# Patient Record
Sex: FEMALE | Race: OTHER | HISPANIC OR LATINO
[De-identification: names, ages, dates, MRNs, and addresses within clinical notes are randomized per-mention and may not be internally consistent; named-entity substitution may affect disease eponyms.]

---

## 2018-05-16 PROBLEM — Z00.00 ENCOUNTER FOR PREVENTIVE HEALTH EXAMINATION: Status: ACTIVE | Noted: 2018-05-16

## 2018-05-23 ENCOUNTER — APPOINTMENT (OUTPATIENT)
Dept: HEMATOLOGY ONCOLOGY | Facility: CLINIC | Age: 18
End: 2018-05-23
Payer: MEDICAID

## 2018-05-23 VITALS
BODY MASS INDEX: 19.26 KG/M2 | DIASTOLIC BLOOD PRESSURE: 66 MMHG | SYSTOLIC BLOOD PRESSURE: 102 MMHG | RESPIRATION RATE: 16 BRPM | HEIGHT: 62.2 IN | OXYGEN SATURATION: 100 % | HEART RATE: 71 BPM | TEMPERATURE: 99.5 F | WEIGHT: 106 LBS

## 2018-05-23 DIAGNOSIS — G43.909 MIGRAINE, UNSPECIFIED, NOT INTRACTABLE, W/OUT STATUS MIGRAINOSUS: ICD-10-CM

## 2018-05-23 DIAGNOSIS — Z86.19 PERSONAL HISTORY OF OTHER INFECTIOUS AND PARASITIC DISEASES: ICD-10-CM

## 2018-05-23 DIAGNOSIS — R76.11 NONSPECIFIC REACTION TO TUBERCULIN SKIN TEST W/OUT ACTIVE TUBERCULOSIS: ICD-10-CM

## 2018-05-23 DIAGNOSIS — A74.9 CHLAMYDIAL INFECTION, UNSPECIFIED: ICD-10-CM

## 2018-05-23 PROCEDURE — 99245 OFF/OP CONSLTJ NEW/EST HI 55: CPT

## 2018-07-06 ENCOUNTER — APPOINTMENT (OUTPATIENT)
Dept: HEMATOLOGY ONCOLOGY | Facility: CLINIC | Age: 18
End: 2018-07-06
Payer: MEDICAID

## 2018-07-06 VITALS
WEIGHT: 105 LBS | BODY MASS INDEX: 19.08 KG/M2 | TEMPERATURE: 98 F | DIASTOLIC BLOOD PRESSURE: 67 MMHG | RESPIRATION RATE: 16 BRPM | HEART RATE: 78 BPM | HEIGHT: 62.2 IN | SYSTOLIC BLOOD PRESSURE: 104 MMHG | OXYGEN SATURATION: 100 %

## 2018-07-06 PROCEDURE — 99214 OFFICE O/P EST MOD 30 MIN: CPT

## 2018-11-02 ENCOUNTER — APPOINTMENT (OUTPATIENT)
Dept: HEMATOLOGY ONCOLOGY | Facility: CLINIC | Age: 18
End: 2018-11-02
Payer: MEDICAID

## 2018-11-02 ENCOUNTER — TRANSCRIPTION ENCOUNTER (OUTPATIENT)
Age: 18
End: 2018-11-02

## 2018-11-02 VITALS
TEMPERATURE: 98.3 F | OXYGEN SATURATION: 99 % | DIASTOLIC BLOOD PRESSURE: 64 MMHG | HEART RATE: 86 BPM | SYSTOLIC BLOOD PRESSURE: 97 MMHG | BODY MASS INDEX: 19.08 KG/M2 | RESPIRATION RATE: 16 BRPM | HEIGHT: 62.2 IN | WEIGHT: 105 LBS

## 2018-11-02 PROCEDURE — 99214 OFFICE O/P EST MOD 30 MIN: CPT

## 2019-02-08 ENCOUNTER — RESULT REVIEW (OUTPATIENT)
Age: 19
End: 2019-02-08

## 2019-02-08 ENCOUNTER — APPOINTMENT (OUTPATIENT)
Dept: HEMATOLOGY ONCOLOGY | Facility: CLINIC | Age: 19
End: 2019-02-08
Payer: MEDICAID

## 2019-02-08 VITALS
DIASTOLIC BLOOD PRESSURE: 65 MMHG | OXYGEN SATURATION: 100 % | RESPIRATION RATE: 16 BRPM | HEIGHT: 62.2 IN | SYSTOLIC BLOOD PRESSURE: 103 MMHG | TEMPERATURE: 99.6 F | HEART RATE: 75 BPM | BODY MASS INDEX: 18.35 KG/M2 | WEIGHT: 100.99 LBS

## 2019-02-08 DIAGNOSIS — R23.8 OTHER SKIN CHANGES: ICD-10-CM

## 2019-02-08 PROCEDURE — 99214 OFFICE O/P EST MOD 30 MIN: CPT

## 2019-02-14 PROBLEM — R23.8 EASY BRUISING: Status: ACTIVE | Noted: 2018-05-23

## 2019-02-14 NOTE — ASSESSMENT
[FreeTextEntry1] : Iron deficiency\par s/p IV injectafer x 1\par Unclear if there was any symptomatic benefit\par Hgb improved to 13.5\par Ferritin normal\par \par Easy bruising\par She has easy bruising interestingly noticed since March. Never had similar symptoms prior\par Manifested in the form of Mild brownish discoloration skin lateral left thigh - completed resolved\par No further work up unless recurs\par \par Work up largely unremarkable including platelet function testing which was negative except for  slight reduction of GP expression CD42b (GPIb-alpha) is of uncertain clinical significance, and likely represents laboratory artifact due to specimen processing and transportation\par vWF Ag 53%- repeat normal\par Doubt she has any clinically significant condition to cause easy bruising\par She reports resolution of her symptoms. No new bruises since last appointment\par \par She reports being 6 weeks pregnant\par \par Follow up in 3 months. CBC, CMP, ferritin\par

## 2019-02-14 NOTE — CONSULT LETTER
[FreeTextEntry3] : Wei Brunson MD, MPH\par Attending Physician\par Hematology Oncology\par [facility] [place]\par [facility]\par

## 2019-03-07 ENCOUNTER — TRANSCRIPTION ENCOUNTER (OUTPATIENT)
Age: 19
End: 2019-03-07

## 2019-05-10 ENCOUNTER — APPOINTMENT (OUTPATIENT)
Dept: HEMATOLOGY ONCOLOGY | Facility: CLINIC | Age: 19
End: 2019-05-10
Payer: MEDICAID

## 2019-05-10 VITALS
TEMPERATURE: 99.1 F | RESPIRATION RATE: 16 BRPM | WEIGHT: 105 LBS | BODY MASS INDEX: 19.08 KG/M2 | SYSTOLIC BLOOD PRESSURE: 92 MMHG | HEART RATE: 67 BPM | DIASTOLIC BLOOD PRESSURE: 62 MMHG | HEIGHT: 62.2 IN | OXYGEN SATURATION: 98 %

## 2019-05-10 PROCEDURE — 99214 OFFICE O/P EST MOD 30 MIN: CPT

## 2019-05-10 RX ORDER — FLUCONAZOLE 150 MG/1
150 TABLET ORAL
Qty: 4 | Refills: 0 | Status: DISCONTINUED | COMMUNITY
Start: 2018-06-27 | End: 2019-05-10

## 2019-05-10 RX ORDER — METRONIDAZOLE 7.5 MG/G
0.75 GEL VAGINAL
Qty: 70 | Refills: 0 | Status: DISCONTINUED | COMMUNITY
Start: 2018-06-12 | End: 2019-05-10

## 2019-05-10 RX ORDER — METRONIDAZOLE 500 MG/1
500 TABLET ORAL
Qty: 14 | Refills: 0 | Status: DISCONTINUED | COMMUNITY
Start: 2018-06-29 | End: 2019-05-10

## 2019-05-10 RX ORDER — NITROFURANTOIN (MONOHYDRATE/MACROCRYSTALS) 25; 75 MG/1; MG/1
100 CAPSULE ORAL
Qty: 10 | Refills: 0 | Status: DISCONTINUED | COMMUNITY
Start: 2018-07-02 | End: 2019-05-10

## 2019-05-10 NOTE — CONSULT LETTER
[Dear  ___] : Dear  [unfilled], [Courtesy Letter:] : I had the pleasure of seeing your patient, [unfilled], in my office today. [Please see my note below.] : Please see my note below. [Consult Closing:] : Thank you very much for allowing me to participate in the care of this patient.  If you have any questions, please do not hesitate to contact me. [Sincerely,] : Sincerely, [FreeTextEntry3] : Wei Brunson MD, MPH\par Attending Physician\par Hematology Oncology\par [facility] [place]\par [facility]\par

## 2019-05-10 NOTE — HISTORY OF PRESENT ILLNESS
[de-identified] : She is seen today for follow up visit\par \par She feels tired and fatigued but not as bad as before her initial iron infusion\par Had a miscarriage end of March- was a first trimester miscarriage - did lose some blood- subsequently had a heavy menses\par \par  [de-identified] : Patient is a 18 year old female consult for ?von willebrand disease \par \par Started having easy bruising in March 2018- saw PCP\par Went away for few weeks\par In April - she drove for ~2 hours wearing jeans- had bruising bilateral thighs- she did not have similar symptoms using same jeans before\par \par She saw her PCP who did labs - which showed vWF Ab of 53%\par \par LMP - on birth control - skipped in march- had menses all April. \par Usually normal and does not have heavy bleeding\par Bleeding all of april- she attributes to stopping her birth control pills\par \par No nose bleeds or gum bleeds\par Does not play sports or do weight lifting\par Bruises when she hits against somethings\par Usually resolves in 1-2 weeks\par Also had bruising in bilateral breast- resolves in a few days\par \par Has migraines - takes advil PRN. Last taken many months back\par \par No family history of bleeding\par \par Has sister - who may be bruising easily but she is clumsy\par \par Labs reviewed\par vWd panel - negative\par Coag panel - negative\par Fibrinogen: normal\par \par Platelet function glycoprotein by flow cytometry:\par platelet surface glycoprotein (GP) profiles are\par essentially normal. There are no significant deficiencies\par of CD41 (GPIIb), CD42a (GPIX), CD42b (GPIb-alpha), CD49b\par (GPIa), CD61 (GPIIIa) or GPVI.\par The slight reduction of GP expression CD42b (GPIb-alpha) is\par of uncertain clinical significance, and likely represents\par laboratory artifact due to specimen processing and\par transportation.\par

## 2019-05-10 NOTE — ASSESSMENT
[FreeTextEntry1] : Iron deficiency\par s/p IV injectafer x 1\par She felt better with iron infusion\par Feels tired\par Lost some blood with miscarriage and subsequent menses were heavy\par Hgb stable\par Ferritin pending. Patient will call to discuss findings/results in a few days\par \par Easy bruising\par She has easy bruising interestingly noticed since March. Never had similar symptoms prior\par Manifested in the form of Mild brownish discoloration skin lateral left thigh - completed resolved\par Work up largely unremarkable including platelet function testing which was negative except for  slight reduction of GP expression CD42b (GPIb-alpha) is of uncertain clinical significance, and likely represents laboratory artifact due to specimen processing and transportation\par vWF Ag 53%- repeat normal\par Doubt she has any clinically significant condition to cause easy bruising\par She reports resolution of her symptoms. No new bruises since\par No further work up unless any new symptoms\par \par Follow up in 3-4 months. CBC, CMP, ferritin\par

## 2019-09-10 ENCOUNTER — APPOINTMENT (OUTPATIENT)
Dept: OBGYN | Facility: CLINIC | Age: 19
End: 2019-09-10
Payer: MEDICAID

## 2019-09-10 VITALS
WEIGHT: 114 LBS | BODY MASS INDEX: 20.71 KG/M2 | SYSTOLIC BLOOD PRESSURE: 118 MMHG | HEIGHT: 62.2 IN | DIASTOLIC BLOOD PRESSURE: 78 MMHG

## 2019-09-10 PROCEDURE — 76817 TRANSVAGINAL US OBSTETRIC: CPT

## 2019-09-10 PROCEDURE — 99203 OFFICE O/P NEW LOW 30 MIN: CPT

## 2019-09-12 ENCOUNTER — RX RENEWAL (OUTPATIENT)
Age: 19
End: 2019-09-12

## 2019-09-13 ENCOUNTER — APPOINTMENT (OUTPATIENT)
Dept: HEMATOLOGY ONCOLOGY | Facility: CLINIC | Age: 19
End: 2019-09-13
Payer: MEDICAID

## 2019-09-13 ENCOUNTER — RESULT REVIEW (OUTPATIENT)
Age: 19
End: 2019-09-13

## 2019-09-13 VITALS
TEMPERATURE: 98 F | SYSTOLIC BLOOD PRESSURE: 101 MMHG | OXYGEN SATURATION: 99 % | HEIGHT: 62.17 IN | DIASTOLIC BLOOD PRESSURE: 64 MMHG | BODY MASS INDEX: 20.35 KG/M2 | RESPIRATION RATE: 20 BRPM | WEIGHT: 112 LBS | HEART RATE: 74 BPM

## 2019-09-13 PROCEDURE — 99214 OFFICE O/P EST MOD 30 MIN: CPT

## 2019-09-13 NOTE — ASSESSMENT
[FreeTextEntry1] : Iron deficiency\par Clinically feels better\par LMP 7/21/19, she is ~6 weeks pregnant\par Ferritin pending. Patient will call to discuss findings/results in a few days\par She cannot tolerate iron pills but is willing to try liquid iron if needed\par Prenatal per ob-gyn\par \par Easy bruising\par She has easy bruising interestingly noticed since March. Never had similar symptoms prior\par Manifested in the form of Mild brownish discoloration skin lateral left thigh - completed resolved\par Work up largely unremarkable including platelet function testing which was negative except for  slight reduction of GP expression CD42b (GPIb-alpha) is of uncertain clinical significance, and likely represents laboratory artifact due to specimen processing and transportation\par vWF Ag 53%- repeat normal\par Doubt she has any clinically significant condition to cause easy bruising\par She reports resolution of her symptoms. No new bruises since\par No further work up unless any new symptoms\par \par Follow up in 8 months or sooner if needed. CBC, CMP, ferritin\par

## 2019-09-13 NOTE — HISTORY OF PRESENT ILLNESS
[de-identified] : Patient is a 18 year old female consult for ?von willebrand disease \par \par Started having easy bruising in March 2018- saw PCP\par Went away for few weeks\par In April - she drove for ~2 hours wearing jeans- had bruising bilateral thighs- she did not have similar symptoms using same jeans before\par \par She saw her PCP who did labs - which showed vWF Ab of 53%\par \par LMP - on birth control - skipped in march- had menses all April. \par Usually normal and does not have heavy bleeding\par Bleeding all of april- she attributes to stopping her birth control pills\par \par No nose bleeds or gum bleeds\par Does not play sports or do weight lifting\par Bruises when she hits against somethings\par Usually resolves in 1-2 weeks\par Also had bruising in bilateral breast- resolves in a few days\par \par Has migraines - takes advil PRN. Last taken many months back\par \par No family history of bleeding\par \par Has sister - who may be bruising easily but she is clumsy\par \par Labs reviewed\par vWd panel - negative\par Coag panel - negative\par Fibrinogen: normal\par \par Platelet function glycoprotein by flow cytometry:\par platelet surface glycoprotein (GP) profiles are\par essentially normal. There are no significant deficiencies\par of CD41 (GPIIb), CD42a (GPIX), CD42b (GPIb-alpha), CD49b\par (GPIa), CD61 (GPIIIa) or GPVI.\par The slight reduction of GP expression CD42b (GPIb-alpha) is\par of uncertain clinical significance, and likely represents\par laboratory artifact due to specimen processing and\par transportation.\par    [de-identified] : She is seen today for follow up visit\par \par She feels good overall\par Her LMP was 7/21/19\par She is 6 weeks pregnant and follows with Dr Crowell (gyn)

## 2019-09-13 NOTE — CONSULT LETTER
[Dear  ___] : Dear  [unfilled], [Courtesy Letter:] : I had the pleasure of seeing your patient, [unfilled], in my office today. [Please see my note below.] : Please see my note below. [Consult Closing:] : Thank you very much for allowing me to participate in the care of this patient.  If you have any questions, please do not hesitate to contact me. [Sincerely,] : Sincerely, [FreeTextEntry3] : Wei Brunson MD, MPH\par Attending Physician\par Hematology Oncology\par [facility] [place]\par [facility]\par  [DrAz  ___] : Dr. GARRETT

## 2019-09-17 ENCOUNTER — RX CHANGE (OUTPATIENT)
Age: 19
End: 2019-09-17

## 2019-09-18 LAB
CANDIDA VAG CYTO: DETECTED
G VAGINALIS+PREV SP MTYP VAG QL MICRO: DETECTED
T VAGINALIS VAG QL WET PREP: NOT DETECTED

## 2019-09-26 DIAGNOSIS — Z32.00 ENCOUNTER FOR PREGNANCY TEST, RESULT UNKNOWN: ICD-10-CM

## 2019-09-27 ENCOUNTER — APPOINTMENT (OUTPATIENT)
Dept: OBGYN | Facility: CLINIC | Age: 19
End: 2019-09-27
Payer: MEDICAID

## 2019-09-27 VITALS
BODY MASS INDEX: 20.06 KG/M2 | WEIGHT: 109 LBS | SYSTOLIC BLOOD PRESSURE: 90 MMHG | HEIGHT: 62 IN | DIASTOLIC BLOOD PRESSURE: 60 MMHG

## 2019-09-27 PROCEDURE — 99213 OFFICE O/P EST LOW 20 MIN: CPT

## 2019-10-08 ENCOUNTER — APPOINTMENT (OUTPATIENT)
Dept: OBGYN | Facility: CLINIC | Age: 19
End: 2019-10-08
Payer: MEDICAID

## 2019-10-08 ENCOUNTER — NON-APPOINTMENT (OUTPATIENT)
Age: 19
End: 2019-10-08

## 2019-10-08 VITALS
SYSTOLIC BLOOD PRESSURE: 106 MMHG | BODY MASS INDEX: 21.9 KG/M2 | HEIGHT: 61 IN | WEIGHT: 116 LBS | DIASTOLIC BLOOD PRESSURE: 60 MMHG

## 2019-10-08 PROCEDURE — 0500F INITIAL PRENATAL CARE VISIT: CPT

## 2019-10-08 PROCEDURE — 36415 COLL VENOUS BLD VENIPUNCTURE: CPT

## 2019-10-08 PROCEDURE — 99213 OFFICE O/P EST LOW 20 MIN: CPT

## 2019-10-15 ENCOUNTER — NON-APPOINTMENT (OUTPATIENT)
Age: 19
End: 2019-10-15

## 2019-10-15 ENCOUNTER — APPOINTMENT (OUTPATIENT)
Dept: OBGYN | Facility: CLINIC | Age: 19
End: 2019-10-15
Payer: MEDICAID

## 2019-10-15 VITALS
WEIGHT: 118 LBS | SYSTOLIC BLOOD PRESSURE: 106 MMHG | DIASTOLIC BLOOD PRESSURE: 62 MMHG | BODY MASS INDEX: 22.28 KG/M2 | HEIGHT: 61 IN

## 2019-10-15 DIAGNOSIS — N76.0 ACUTE VAGINITIS: ICD-10-CM

## 2019-10-15 DIAGNOSIS — B96.89 ACUTE VAGINITIS: ICD-10-CM

## 2019-10-15 PROCEDURE — 99213 OFFICE O/P EST LOW 20 MIN: CPT

## 2019-10-15 NOTE — PHYSICAL EXAM
[Normal] : cervix [No Bleeding] : there was no active vaginal bleeding [FreeTextEntry4] : thick yellowish discharge c/w BV noted; no erythema. [Uterine Adnexae] : were not tender and not enlarged

## 2019-10-17 ENCOUNTER — RESULT REVIEW (OUTPATIENT)
Age: 19
End: 2019-10-17

## 2019-10-17 DIAGNOSIS — Z86.19 PERSONAL HISTORY OF OTHER INFECTIOUS AND PARASITIC DISEASES: ICD-10-CM

## 2019-10-17 LAB
ABO + RH PNL BLD: NORMAL
ABO + RH PNL BLD: NORMAL
APPEARANCE: CLEAR
AR GENE MUT ANL BLD/T: NEGATIVE
BACTERIA UR CULT: NORMAL
BACTERIA: NEGATIVE
BASOPHILS # BLD AUTO: 0.02 K/UL
BASOPHILS NFR BLD AUTO: 0.2 %
BILIRUB UR QL STRIP: NORMAL
BILIRUBIN URINE: NEGATIVE
BLD GP AB SCN SERPL QL: NORMAL
BLOOD URINE: NEGATIVE
C TRACH RRNA SPEC QL NAA+PROBE: NOT DETECTED
CANDIDA VAG CYTO: DETECTED
CFTR MUT TESTED BLD/T: NEGATIVE
COLOR: NORMAL
EOSINOPHIL # BLD AUTO: 0.06 K/UL
EOSINOPHIL NFR BLD AUTO: 0.6 %
G VAGINALIS+PREV SP MTYP VAG QL MICRO: DETECTED
GLUCOSE QUALITATIVE U: NEGATIVE
GLUCOSE UR-MCNC: NORMAL
HBV SURFACE AG SER QL: NONREACTIVE
HCG UR QL: 0.2 EU/DL
HCT VFR BLD CALC: 38.4 %
HCV AB SER QL: NONREACTIVE
HCV S/CO RATIO: 0.19 S/CO
HGB A MFR BLD: 97.2 %
HGB A2 MFR BLD: 2.8 %
HGB BLD-MCNC: 12.6 G/DL
HGB FRACT BLD-IMP: NORMAL
HGB UR QL STRIP.AUTO: NORMAL
HIV1+2 AB SPEC QL IA.RAPID: NONREACTIVE
HYALINE CASTS: 0 /LPF
IMM GRANULOCYTES NFR BLD AUTO: 0.4 %
KETONES UR-MCNC: NORMAL
KETONES URINE: NEGATIVE
LEUKOCYTE ESTERASE UR QL STRIP: NORMAL
LEUKOCYTE ESTERASE URINE: NEGATIVE
LYMPHOCYTES # BLD AUTO: 2.15 K/UL
LYMPHOCYTES NFR BLD AUTO: 22.6 %
MAN DIFF?: NORMAL
MCHC RBC-ENTMCNC: 30.1 PG
MCHC RBC-ENTMCNC: 32.8 GM/DL
MCV RBC AUTO: 91.6 FL
MEV IGG FLD QL IA: 202 AU/ML
MEV IGG+IGM SER-IMP: POSITIVE
MICROSCOPIC-UA: NORMAL
MONOCYTES # BLD AUTO: 0.52 K/UL
MONOCYTES NFR BLD AUTO: 5.5 %
N GONORRHOEA RRNA SPEC QL NAA+PROBE: NOT DETECTED
NEUTROPHILS # BLD AUTO: 6.72 K/UL
NEUTROPHILS NFR BLD AUTO: 70.7 %
NITRITE UR QL STRIP: NORMAL
NITRITE URINE: NEGATIVE
PH UR STRIP: 7
PH URINE: 7
PLATELET # BLD AUTO: 258 K/UL
PROT UR STRIP-MCNC: NORMAL
PROTEIN URINE: NEGATIVE
RBC # BLD: 4.19 M/UL
RBC # FLD: 12.5 %
RED BLOOD CELLS URINE: 4 /HPF
RUBV IGG FLD-ACNC: 0.4 INDEX
RUBV IGG SER-IMP: NEGATIVE
SICKLE CELLS BLD QL SMEAR: NEGATIVE
SOURCE AMPLIFICATION: NORMAL
SP GR UR STRIP: 1.02
SPECIFIC GRAVITY URINE: 1.01
SQUAMOUS EPITHELIAL CELLS: 2 /HPF
T PALLIDUM AB SER QL IA: NEGATIVE
T VAGINALIS VAG QL WET PREP: NOT DETECTED
UROBILINOGEN URINE: NORMAL
WBC # FLD AUTO: 9.51 K/UL
WHITE BLOOD CELLS URINE: 0 /HPF

## 2019-10-21 ENCOUNTER — APPOINTMENT (OUTPATIENT)
Dept: OBGYN | Facility: CLINIC | Age: 19
End: 2019-10-21

## 2019-10-23 ENCOUNTER — NON-APPOINTMENT (OUTPATIENT)
Age: 19
End: 2019-10-23

## 2019-10-31 ENCOUNTER — NON-APPOINTMENT (OUTPATIENT)
Age: 19
End: 2019-10-31

## 2019-10-31 ENCOUNTER — APPOINTMENT (OUTPATIENT)
Dept: OBGYN | Facility: CLINIC | Age: 19
End: 2019-10-31
Payer: MEDICAID

## 2019-10-31 VITALS
BODY MASS INDEX: 21.52 KG/M2 | DIASTOLIC BLOOD PRESSURE: 70 MMHG | WEIGHT: 114 LBS | SYSTOLIC BLOOD PRESSURE: 110 MMHG | HEIGHT: 61 IN

## 2019-10-31 LAB
BILIRUB UR QL STRIP: NORMAL
CLARITY UR: CLEAR
COLLECTION METHOD: NORMAL
GLUCOSE UR-MCNC: NORMAL
HCG UR QL: 0.2 EU/DL
HGB UR QL STRIP.AUTO: NORMAL
KETONES UR-MCNC: NORMAL
LEUKOCYTE ESTERASE UR QL STRIP: NORMAL
NITRITE UR QL STRIP: NORMAL
PH UR STRIP: 7
PROT UR STRIP-MCNC: NORMAL
SP GR UR STRIP: 1.01

## 2019-10-31 PROCEDURE — 81015 MICROSCOPIC EXAM OF URINE: CPT

## 2019-10-31 PROCEDURE — 99213 OFFICE O/P EST LOW 20 MIN: CPT

## 2019-10-31 PROCEDURE — 0502F SUBSEQUENT PRENATAL CARE: CPT

## 2019-11-13 ENCOUNTER — NON-APPOINTMENT (OUTPATIENT)
Age: 19
End: 2019-11-13

## 2019-11-22 ENCOUNTER — APPOINTMENT (OUTPATIENT)
Dept: OBGYN | Facility: CLINIC | Age: 19
End: 2019-11-22
Payer: MEDICAID

## 2019-11-22 ENCOUNTER — LABORATORY RESULT (OUTPATIENT)
Age: 19
End: 2019-11-22

## 2019-11-22 ENCOUNTER — NON-APPOINTMENT (OUTPATIENT)
Age: 19
End: 2019-11-22

## 2019-11-22 VITALS
SYSTOLIC BLOOD PRESSURE: 110 MMHG | HEIGHT: 61 IN | DIASTOLIC BLOOD PRESSURE: 70 MMHG | BODY MASS INDEX: 21.9 KG/M2 | WEIGHT: 116 LBS

## 2019-11-22 PROCEDURE — 81015 MICROSCOPIC EXAM OF URINE: CPT

## 2019-11-22 PROCEDURE — 0502F SUBSEQUENT PRENATAL CARE: CPT

## 2019-11-22 PROCEDURE — 99213 OFFICE O/P EST LOW 20 MIN: CPT

## 2019-11-26 ENCOUNTER — NON-APPOINTMENT (OUTPATIENT)
Age: 19
End: 2019-11-26

## 2019-11-26 LAB
2ND TRIMESTER DATA: NORMAL
AFP PNL SERPL: NORMAL
AFP SERPL-ACNC: NORMAL
APPEARANCE: ABNORMAL
BACTERIA UR CULT: NORMAL
BILIRUB UR QL STRIP: NEGATIVE
BILIRUBIN URINE: NEGATIVE
BLOOD URINE: NEGATIVE
CANDIDA VAG CYTO: NOT DETECTED
CLARITY UR: NORMAL
CLINICAL BIOCHEMIST REVIEW: NORMAL
COLLECTION METHOD: NORMAL
COLOR: YELLOW
G VAGINALIS+PREV SP MTYP VAG QL MICRO: DETECTED
GLUCOSE QUALITATIVE U: NEGATIVE
GLUCOSE UR-MCNC: NEGATIVE
HCG UR QL: 0.2 EU/DL
HGB UR QL STRIP.AUTO: NEGATIVE
KETONES UR-MCNC: NEGATIVE
KETONES URINE: NEGATIVE
LEUKOCYTE ESTERASE UR QL STRIP: NORMAL
LEUKOCYTE ESTERASE URINE: ABNORMAL
NITRITE UR QL STRIP: NEGATIVE
NITRITE URINE: NEGATIVE
NOTES NTD: NORMAL
PH UR STRIP: 7.5
PH URINE: 7.5
PROT UR STRIP-MCNC: 30
PROTEIN URINE: ABNORMAL
SP GR UR STRIP: 1.01
SPECIFIC GRAVITY URINE: 1.02
T VAGINALIS VAG QL WET PREP: NOT DETECTED
UROBILINOGEN URINE: NORMAL

## 2019-12-11 ENCOUNTER — NON-APPOINTMENT (OUTPATIENT)
Age: 19
End: 2019-12-11

## 2019-12-20 ENCOUNTER — NON-APPOINTMENT (OUTPATIENT)
Age: 19
End: 2019-12-20

## 2019-12-20 ENCOUNTER — APPOINTMENT (OUTPATIENT)
Dept: OBGYN | Facility: CLINIC | Age: 19
End: 2019-12-20
Payer: MEDICAID

## 2019-12-20 VITALS
HEIGHT: 61 IN | SYSTOLIC BLOOD PRESSURE: 90 MMHG | DIASTOLIC BLOOD PRESSURE: 60 MMHG | WEIGHT: 116 LBS | BODY MASS INDEX: 21.9 KG/M2

## 2019-12-20 DIAGNOSIS — N39.0 URINARY TRACT INFECTION, SITE NOT SPECIFIED: ICD-10-CM

## 2019-12-20 PROCEDURE — 0502F SUBSEQUENT PRENATAL CARE: CPT

## 2019-12-20 PROCEDURE — 99213 OFFICE O/P EST LOW 20 MIN: CPT

## 2019-12-23 ENCOUNTER — NON-APPOINTMENT (OUTPATIENT)
Age: 19
End: 2019-12-23

## 2020-01-03 ENCOUNTER — MEDICATION RENEWAL (OUTPATIENT)
Age: 20
End: 2020-01-03

## 2020-01-07 ENCOUNTER — APPOINTMENT (OUTPATIENT)
Dept: OBGYN | Facility: CLINIC | Age: 20
End: 2020-01-07

## 2020-01-14 ENCOUNTER — APPOINTMENT (OUTPATIENT)
Dept: OBGYN | Facility: CLINIC | Age: 20
End: 2020-01-14
Payer: MEDICAID

## 2020-01-14 ENCOUNTER — NON-APPOINTMENT (OUTPATIENT)
Age: 20
End: 2020-01-14

## 2020-01-14 VITALS
WEIGHT: 127 LBS | SYSTOLIC BLOOD PRESSURE: 116 MMHG | DIASTOLIC BLOOD PRESSURE: 78 MMHG | HEIGHT: 61 IN | BODY MASS INDEX: 23.98 KG/M2

## 2020-01-14 PROCEDURE — 99213 OFFICE O/P EST LOW 20 MIN: CPT

## 2020-01-14 PROCEDURE — 0502F SUBSEQUENT PRENATAL CARE: CPT

## 2020-01-18 LAB
BACTERIA UR CULT: NORMAL
BILIRUB UR QL STRIP: NORMAL
CLARITY UR: CLEAR
COLLECTION METHOD: NORMAL
GLUCOSE 1H P 50 G GLC PO SERPL-MCNC: 85 MG/DL
GLUCOSE UR-MCNC: NORMAL
HCG UR QL: 0.2 EU/DL
HGB UR QL STRIP.AUTO: NORMAL
KETONES UR-MCNC: NORMAL
LEUKOCYTE ESTERASE UR QL STRIP: NORMAL
NITRITE UR QL STRIP: NORMAL
PH UR STRIP: 7
PROT UR STRIP-MCNC: NORMAL
SP GR UR STRIP: 1.02

## 2020-01-21 LAB
BILIRUB UR QL STRIP: NORMAL
CLARITY UR: CLEAR
COLLECTION METHOD: NORMAL
GLUCOSE UR-MCNC: NORMAL
HCG UR QL: 0.2 EU/DL
HGB UR QL STRIP.AUTO: NORMAL
KETONES UR-MCNC: NORMAL
LEUKOCYTE ESTERASE UR QL STRIP: NORMAL
NITRITE UR QL STRIP: NORMAL
PH UR STRIP: 7
PROT UR STRIP-MCNC: 30
SP GR UR STRIP: 1.02

## 2020-01-27 LAB
BASOPHILS # BLD AUTO: 0.01 K/UL
BASOPHILS NFR BLD AUTO: 0.1 %
EOSINOPHIL # BLD AUTO: 0.06 K/UL
EOSINOPHIL NFR BLD AUTO: 0.6 %
HCT VFR BLD CALC: 32.7 %
HGB BLD-MCNC: 10.6 G/DL
IMM GRANULOCYTES NFR BLD AUTO: 0.7 %
LYMPHOCYTES # BLD AUTO: 1 K/UL
LYMPHOCYTES NFR BLD AUTO: 9.3 %
MAN DIFF?: NORMAL
MCHC RBC-ENTMCNC: 30.7 PG
MCHC RBC-ENTMCNC: 32.4 GM/DL
MCV RBC AUTO: 94.8 FL
MONOCYTES # BLD AUTO: 0.86 K/UL
MONOCYTES NFR BLD AUTO: 8 %
NEUTROPHILS # BLD AUTO: 8.69 K/UL
NEUTROPHILS NFR BLD AUTO: 81.3 %
PLATELET # BLD AUTO: 252 K/UL
RBC # BLD: 3.45 M/UL
RBC # FLD: 13.4 %
WBC # FLD AUTO: 10.7 K/UL

## 2020-01-28 ENCOUNTER — NON-APPOINTMENT (OUTPATIENT)
Age: 20
End: 2020-01-28

## 2020-01-28 ENCOUNTER — APPOINTMENT (OUTPATIENT)
Dept: OBGYN | Facility: CLINIC | Age: 20
End: 2020-01-28
Payer: MEDICAID

## 2020-01-28 VITALS
HEIGHT: 61 IN | BODY MASS INDEX: 23.98 KG/M2 | DIASTOLIC BLOOD PRESSURE: 60 MMHG | SYSTOLIC BLOOD PRESSURE: 102 MMHG | WEIGHT: 127 LBS

## 2020-01-28 PROCEDURE — 0502F SUBSEQUENT PRENATAL CARE: CPT

## 2020-01-28 PROCEDURE — 99213 OFFICE O/P EST LOW 20 MIN: CPT

## 2020-01-30 ENCOUNTER — NON-APPOINTMENT (OUTPATIENT)
Age: 20
End: 2020-01-30

## 2020-02-07 ENCOUNTER — MED ADMIN CHARGE (OUTPATIENT)
Age: 20
End: 2020-02-07

## 2020-02-07 ENCOUNTER — NON-APPOINTMENT (OUTPATIENT)
Age: 20
End: 2020-02-07

## 2020-02-07 ENCOUNTER — APPOINTMENT (OUTPATIENT)
Dept: OBGYN | Facility: CLINIC | Age: 20
End: 2020-02-07
Payer: MEDICAID

## 2020-02-07 VITALS
WEIGHT: 129 LBS | BODY MASS INDEX: 24.35 KG/M2 | SYSTOLIC BLOOD PRESSURE: 110 MMHG | DIASTOLIC BLOOD PRESSURE: 80 MMHG | HEIGHT: 61 IN

## 2020-02-07 PROCEDURE — 0502F SUBSEQUENT PRENATAL CARE: CPT

## 2020-02-07 PROCEDURE — 99213 OFFICE O/P EST LOW 20 MIN: CPT

## 2020-02-11 ENCOUNTER — NON-APPOINTMENT (OUTPATIENT)
Age: 20
End: 2020-02-11

## 2020-02-11 LAB
BILIRUB UR QL STRIP: NORMAL
CANDIDA VAG CYTO: DETECTED
CLARITY UR: CLEAR
COLLECTION METHOD: NORMAL
FERRITIN SERPL-MCNC: 73 NG/ML
G VAGINALIS+PREV SP MTYP VAG QL MICRO: NOT DETECTED
GLUCOSE UR-MCNC: NORMAL
HGB UR QL STRIP.AUTO: NORMAL
KETONES UR-MCNC: NORMAL
LEUKOCYTE ESTERASE UR QL STRIP: NORMAL
NITRITE UR QL STRIP: NORMAL
PH UR STRIP: 7
SP GR UR STRIP: 1.02
T VAGINALIS VAG QL WET PREP: NOT DETECTED

## 2020-02-25 ENCOUNTER — APPOINTMENT (OUTPATIENT)
Dept: OBGYN | Facility: CLINIC | Age: 20
End: 2020-02-25
Payer: MEDICAID

## 2020-02-25 ENCOUNTER — NON-APPOINTMENT (OUTPATIENT)
Age: 20
End: 2020-02-25

## 2020-02-25 VITALS
HEIGHT: 61 IN | BODY MASS INDEX: 24.73 KG/M2 | SYSTOLIC BLOOD PRESSURE: 110 MMHG | WEIGHT: 131 LBS | DIASTOLIC BLOOD PRESSURE: 70 MMHG

## 2020-02-25 PROCEDURE — 99213 OFFICE O/P EST LOW 20 MIN: CPT

## 2020-02-25 PROCEDURE — 0502F SUBSEQUENT PRENATAL CARE: CPT

## 2020-02-26 ENCOUNTER — RESULT REVIEW (OUTPATIENT)
Age: 20
End: 2020-02-26

## 2020-02-26 ENCOUNTER — APPOINTMENT (OUTPATIENT)
Dept: HEMATOLOGY ONCOLOGY | Facility: CLINIC | Age: 20
End: 2020-02-26
Payer: MEDICAID

## 2020-02-26 VITALS
HEIGHT: 60.98 IN | WEIGHT: 132.4 LBS | SYSTOLIC BLOOD PRESSURE: 98 MMHG | HEART RATE: 90 BPM | RESPIRATION RATE: 16 BRPM | TEMPERATURE: 97.9 F | OXYGEN SATURATION: 99 % | DIASTOLIC BLOOD PRESSURE: 54 MMHG | BODY MASS INDEX: 25 KG/M2

## 2020-02-26 PROCEDURE — 99214 OFFICE O/P EST MOD 30 MIN: CPT

## 2020-02-26 NOTE — ASSESSMENT
[FreeTextEntry1] : Iron deficiency\par Clinically feels better\par LMP 7/21/19, she is ~30 weeks pregnant\par She had Ferritin done with Dr Crowell and it was slightly slow\par She was tired and fatigued and called with results s/p injectafer x 1\par Labs reviewed and discussed\par Expect her ferritin to be normal or high\par IV Iron PRN\par She cannot tolerate iron pills\par Prenatal per ob-gyn\par COntinue with prenatal vitamins\par \par Easy bruising\par She has easy bruising interestingly noticed since March. Never had similar symptoms prior\par Manifested in the form of Mild brownish discoloration skin lateral left thigh - completed resolved\par Work up largely unremarkable including platelet function testing which was negative except for  slight reduction of GP expression CD42b (GPIb-alpha) is of uncertain clinical significance, and likely represents laboratory artifact due to specimen processing and transportation\par vWF Ag 53%- repeat normal\par Doubt she has any clinically significant condition to cause easy bruising\par She reports resolution of her symptoms. No new bruises since\par No further work up unless any new symptoms\par \par Follow up in 8 weeks or sooner if needed. CBC, CMP, ferritin\par

## 2020-02-26 NOTE — CONSULT LETTER
[Dear  ___] : Dear  [unfilled], [Please see my note below.] : Please see my note below. [Courtesy Letter:] : I had the pleasure of seeing your patient, [unfilled], in my office today. [Sincerely,] : Sincerely, [Consult Closing:] : Thank you very much for allowing me to participate in the care of this patient.  If you have any questions, please do not hesitate to contact me. [DrAz  ___] : Dr. GARRETT [FreeTextEntry3] : Wei Brunson MD, MPH\par Attending Physician\par Hematology Oncology\par [facility] [place]\par [facility]\par

## 2020-02-26 NOTE — HISTORY OF PRESENT ILLNESS
[de-identified] : Patient is a 18 year old female consult for ?von willebrand disease \par \par Started having easy bruising in March 2018- saw PCP\par Went away for few weeks\par In April - she drove for ~2 hours wearing jeans- had bruising bilateral thighs- she did not have similar symptoms using same jeans before\par \par She saw her PCP who did labs - which showed vWF Ab of 53%\par \par LMP - on birth control - skipped in march- had menses all April. \par Usually normal and does not have heavy bleeding\par Bleeding all of april- she attributes to stopping her birth control pills\par \par No nose bleeds or gum bleeds\par Does not play sports or do weight lifting\par Bruises when she hits against somethings\par Usually resolves in 1-2 weeks\par Also had bruising in bilateral breast- resolves in a few days\par \par Has migraines - takes advil PRN. Last taken many months back\par \par No family history of bleeding\par \par Has sister - who may be bruising easily but she is clumsy\par \par Labs reviewed\par vWd panel - negative\par Coag panel - negative\par Fibrinogen: normal\par \par Platelet function glycoprotein by flow cytometry:\par platelet surface glycoprotein (GP) profiles are\par essentially normal. There are no significant deficiencies\par of CD41 (GPIIb), CD42a (GPIX), CD42b (GPIb-alpha), CD49b\par (GPIa), CD61 (GPIIIa) or GPVI.\par The slight reduction of GP expression CD42b (GPIb-alpha) is\par of uncertain clinical significance, and likely represents\par laboratory artifact due to specimen processing and\par transportation.\par    [de-identified] : She is seen today for follow up visit.\par She is 30 weeks pregnant. YISEL 5/4/20\par \par Received IV Injectafer 2 weeks back. Tolerated it very well. No complications\par Continues to feel tired and fatigued- attributes to the emotional ups and downs of pregnancy. [place], however she does have her family and her boyfriends family to help her with the pregnancy\par \par

## 2020-03-10 ENCOUNTER — APPOINTMENT (OUTPATIENT)
Dept: OBGYN | Facility: CLINIC | Age: 20
End: 2020-03-10
Payer: MEDICAID

## 2020-03-10 VITALS
WEIGHT: 136 LBS | HEIGHT: 60 IN | SYSTOLIC BLOOD PRESSURE: 100 MMHG | DIASTOLIC BLOOD PRESSURE: 68 MMHG | BODY MASS INDEX: 26.7 KG/M2

## 2020-03-10 LAB
BILIRUB UR QL STRIP: NEGATIVE
CLARITY UR: CLEAR
COLLECTION METHOD: NORMAL
GLUCOSE UR-MCNC: NEGATIVE
HCG UR QL: 0.2 EU/DL
HGB UR QL STRIP.AUTO: NEGATIVE
KETONES UR-MCNC: NEGATIVE
LEUKOCYTE ESTERASE UR QL STRIP: NORMAL
NITRITE UR QL STRIP: NEGATIVE
PH UR STRIP: 7
PROT UR STRIP-MCNC: NORMAL
SP GR UR STRIP: 1.02

## 2020-03-10 PROCEDURE — 0502F SUBSEQUENT PRENATAL CARE: CPT

## 2020-03-10 PROCEDURE — 99213 OFFICE O/P EST LOW 20 MIN: CPT

## 2020-03-17 ENCOUNTER — APPOINTMENT (OUTPATIENT)
Dept: OBGYN | Facility: CLINIC | Age: 20
End: 2020-03-17
Payer: MEDICAID

## 2020-03-17 ENCOUNTER — NON-APPOINTMENT (OUTPATIENT)
Age: 20
End: 2020-03-17

## 2020-03-17 VITALS
BODY MASS INDEX: 26.7 KG/M2 | DIASTOLIC BLOOD PRESSURE: 70 MMHG | SYSTOLIC BLOOD PRESSURE: 100 MMHG | HEIGHT: 60 IN | WEIGHT: 136 LBS

## 2020-03-17 PROCEDURE — 99213 OFFICE O/P EST LOW 20 MIN: CPT

## 2020-03-17 PROCEDURE — 0502F SUBSEQUENT PRENATAL CARE: CPT

## 2020-03-25 ENCOUNTER — APPOINTMENT (OUTPATIENT)
Dept: OBGYN | Facility: CLINIC | Age: 20
End: 2020-03-25

## 2020-04-01 ENCOUNTER — APPOINTMENT (OUTPATIENT)
Dept: OBGYN | Facility: CLINIC | Age: 20
End: 2020-04-01
Payer: MEDICAID

## 2020-04-01 ENCOUNTER — NON-APPOINTMENT (OUTPATIENT)
Age: 20
End: 2020-04-01

## 2020-04-01 VITALS
WEIGHT: 135 LBS | BODY MASS INDEX: 26.5 KG/M2 | HEIGHT: 60 IN | SYSTOLIC BLOOD PRESSURE: 90 MMHG | DIASTOLIC BLOOD PRESSURE: 60 MMHG | TEMPERATURE: 99.8 F

## 2020-04-01 PROCEDURE — 99213 OFFICE O/P EST LOW 20 MIN: CPT

## 2020-04-01 PROCEDURE — 0502F SUBSEQUENT PRENATAL CARE: CPT

## 2020-04-03 LAB — BACTERIA UR CULT: NORMAL

## 2020-04-08 ENCOUNTER — NON-APPOINTMENT (OUTPATIENT)
Age: 20
End: 2020-04-08

## 2020-04-08 ENCOUNTER — APPOINTMENT (OUTPATIENT)
Dept: OBGYN | Facility: CLINIC | Age: 20
End: 2020-04-08
Payer: MEDICAID

## 2020-04-08 VITALS
HEIGHT: 60 IN | DIASTOLIC BLOOD PRESSURE: 60 MMHG | WEIGHT: 138 LBS | BODY MASS INDEX: 27.09 KG/M2 | TEMPERATURE: 98 F | SYSTOLIC BLOOD PRESSURE: 110 MMHG

## 2020-04-08 LAB
BILIRUB UR QL STRIP: NORMAL
GLUCOSE UR-MCNC: NORMAL
HCG UR QL: 0.2 EU/DL
HGB UR QL STRIP.AUTO: NORMAL
KETONES UR-MCNC: NORMAL
LEUKOCYTE ESTERASE UR QL STRIP: NORMAL
NITRITE UR QL STRIP: NORMAL
PH UR STRIP: 7.5
PROT UR STRIP-MCNC: 30
SP GR UR STRIP: 1.02

## 2020-04-08 PROCEDURE — 99213 OFFICE O/P EST LOW 20 MIN: CPT

## 2020-04-08 PROCEDURE — 0502F SUBSEQUENT PRENATAL CARE: CPT

## 2020-04-10 LAB — BACTERIA UR CULT: NORMAL

## 2020-04-15 ENCOUNTER — NON-APPOINTMENT (OUTPATIENT)
Age: 20
End: 2020-04-15

## 2020-04-15 LAB — B-HEM STREP SPEC QL CULT: NORMAL

## 2020-04-17 ENCOUNTER — APPOINTMENT (OUTPATIENT)
Dept: OBGYN | Facility: CLINIC | Age: 20
End: 2020-04-17
Payer: MEDICAID

## 2020-04-17 VITALS
WEIGHT: 144 LBS | SYSTOLIC BLOOD PRESSURE: 114 MMHG | DIASTOLIC BLOOD PRESSURE: 72 MMHG | HEIGHT: 60 IN | BODY MASS INDEX: 28.27 KG/M2

## 2020-04-17 PROCEDURE — 0502F SUBSEQUENT PRENATAL CARE: CPT

## 2020-04-17 PROCEDURE — 99213 OFFICE O/P EST LOW 20 MIN: CPT

## 2020-04-22 ENCOUNTER — APPOINTMENT (OUTPATIENT)
Dept: HEMATOLOGY ONCOLOGY | Facility: CLINIC | Age: 20
End: 2020-04-22

## 2020-04-24 ENCOUNTER — NON-APPOINTMENT (OUTPATIENT)
Age: 20
End: 2020-04-24

## 2020-04-24 ENCOUNTER — APPOINTMENT (OUTPATIENT)
Dept: OBGYN | Facility: CLINIC | Age: 20
End: 2020-04-24
Payer: MEDICAID

## 2020-04-24 VITALS
SYSTOLIC BLOOD PRESSURE: 98 MMHG | DIASTOLIC BLOOD PRESSURE: 62 MMHG | TEMPERATURE: 98.5 F | BODY MASS INDEX: 26.43 KG/M2 | WEIGHT: 140 LBS | HEIGHT: 61 IN

## 2020-04-24 PROCEDURE — 0502F SUBSEQUENT PRENATAL CARE: CPT

## 2020-04-24 PROCEDURE — 99213 OFFICE O/P EST LOW 20 MIN: CPT

## 2020-05-01 ENCOUNTER — APPOINTMENT (OUTPATIENT)
Dept: OBGYN | Facility: CLINIC | Age: 20
End: 2020-05-01
Payer: MEDICAID

## 2020-05-01 ENCOUNTER — NON-APPOINTMENT (OUTPATIENT)
Age: 20
End: 2020-05-01

## 2020-05-01 VITALS
HEIGHT: 61 IN | DIASTOLIC BLOOD PRESSURE: 60 MMHG | SYSTOLIC BLOOD PRESSURE: 100 MMHG | WEIGHT: 143 LBS | BODY MASS INDEX: 27 KG/M2

## 2020-05-01 PROCEDURE — 99213 OFFICE O/P EST LOW 20 MIN: CPT

## 2020-05-08 ENCOUNTER — NON-APPOINTMENT (OUTPATIENT)
Age: 20
End: 2020-05-08

## 2020-05-08 ENCOUNTER — APPOINTMENT (OUTPATIENT)
Dept: OBGYN | Facility: CLINIC | Age: 20
End: 2020-05-08

## 2020-05-08 VITALS
SYSTOLIC BLOOD PRESSURE: 110 MMHG | DIASTOLIC BLOOD PRESSURE: 70 MMHG | BODY MASS INDEX: 27.56 KG/M2 | WEIGHT: 146 LBS | HEIGHT: 61 IN

## 2020-06-26 ENCOUNTER — APPOINTMENT (OUTPATIENT)
Dept: OBGYN | Facility: CLINIC | Age: 20
End: 2020-06-26
Payer: MEDICAID

## 2020-06-26 VITALS
TEMPERATURE: 99.1 F | DIASTOLIC BLOOD PRESSURE: 70 MMHG | HEIGHT: 61 IN | SYSTOLIC BLOOD PRESSURE: 120 MMHG | BODY MASS INDEX: 24.17 KG/M2 | WEIGHT: 128 LBS

## 2020-06-26 LAB
BILIRUB UR QL STRIP: NEGATIVE
BILIRUB UR QL STRIP: NORMAL
BILIRUB UR QL STRIP: NORMAL
CLARITY UR: CLEAR
CLARITY UR: CLEAR
COLLECTION METHOD: NORMAL
COLLECTION METHOD: NORMAL
GLUCOSE UR-MCNC: NEGATIVE
GLUCOSE UR-MCNC: NORMAL
GLUCOSE UR-MCNC: NORMAL
HCG UR QL: 0.2 EU/DL
HGB UR QL STRIP.AUTO: NEGATIVE
HGB UR QL STRIP.AUTO: NORMAL
HGB UR QL STRIP.AUTO: NORMAL
KETONES UR-MCNC: NEGATIVE
KETONES UR-MCNC: NORMAL
KETONES UR-MCNC: NORMAL
LEUKOCYTE ESTERASE UR QL STRIP: NORMAL
NITRITE UR QL STRIP: NEGATIVE
NITRITE UR QL STRIP: NORMAL
NITRITE UR QL STRIP: NORMAL
PH UR STRIP: 7
PH UR STRIP: 7
PH UR STRIP: 8.5
PROT UR STRIP-MCNC: NORMAL
SP GR UR STRIP: 1.02

## 2020-06-26 PROCEDURE — 0503F POSTPARTUM CARE VISIT: CPT

## 2020-06-26 PROCEDURE — 99213 OFFICE O/P EST LOW 20 MIN: CPT

## 2020-06-26 NOTE — HISTORY OF PRESENT ILLNESS
[Postpartum Follow Up] : postpartum follow up [Complications:___] : no complications [Delivery Date: ___] : on [unfilled] [] : delivered by vaginal delivery [Male] : Delivery History: baby boy [Wt. ___] : weighing [unfilled] [Breastfeeding] : not currently nursing [S/Sx PP Depression] : no signs/symptoms of postpartum depression [Erythema] : not erythematous [Back to Normal] : is back to normal in size [Mild] : mild vaginal bleeding [Normal] : the vagina was normal [Cervix Sample Taken] : cervical sample not taken for a Pap smear [Doing Well] : is doing well [Not Done] : Examination of breasts not done [No Sign of Infection] : is showing no signs of infection [Excellent Pain Control] : has excellent pain control [None] : None [de-identified] : Discussed normal postpartum changes and return to pre- pregnancy activities.  Encouraged return to baseline pre- pregnancy weight.  Discussed diet and continuation of prenatal vitamins if breastfeeding.  Discussed contraception options and timing of subsequent pregnancies.  Evaluated for postpartum depression and counseled regarding treatment options.  Declines contraception;  deployed in [facility].

## 2020-06-26 NOTE — HISTORY OF PRESENT ILLNESS
[Postpartum Follow Up] : postpartum follow up [Complications:___] : no complications [Delivery Date: ___] : on [unfilled] [] : delivered by vaginal delivery [Male] : Delivery History: baby boy [Wt. ___] : weighing [unfilled] [Breastfeeding] : not currently nursing [S/Sx PP Depression] : no signs/symptoms of postpartum depression [Erythema] : not erythematous [Back to Normal] : is back to normal in size [Mild] : mild vaginal bleeding [Normal] : the vagina was normal [Cervix Sample Taken] : cervical sample not taken for a Pap smear [Doing Well] : is doing well [Not Done] : Examination of breasts not done [No Sign of Infection] : is showing no signs of infection [Excellent Pain Control] : has excellent pain control [None] : None [de-identified] : Discussed normal postpartum changes and return to pre- pregnancy activities.  Encouraged return to baseline pre- pregnancy weight.  Discussed diet and continuation of prenatal vitamins if breastfeeding.  Discussed contraception options and timing of subsequent pregnancies.  Evaluated for postpartum depression and counseled regarding treatment options.  Declines contraception;  deployed in [facility].

## 2020-07-03 LAB
BILIRUB UR QL STRIP: NEGATIVE
CANDIDA VAG CYTO: DETECTED
CANDIDA VAG CYTO: DETECTED
CLARITY UR: CLEAR
COLLECTION METHOD: NORMAL
G VAGINALIS+PREV SP MTYP VAG QL MICRO: DETECTED
G VAGINALIS+PREV SP MTYP VAG QL MICRO: DETECTED
GLUCOSE UR-MCNC: NEGATIVE
HCG UR QL: 0.2 EU/DL
HGB UR QL STRIP.AUTO: NEGATIVE
HIV1+2 AB SPEC QL IA.RAPID: NONREACTIVE
KETONES UR-MCNC: NEGATIVE
LEUKOCYTE ESTERASE UR QL STRIP: NORMAL
NITRITE UR QL STRIP: NEGATIVE
PH UR STRIP: 7.5
PROT UR STRIP-MCNC: NORMAL
SP GR UR STRIP: 1.02
T PALLIDUM AB SER QL IA: NEGATIVE
T VAGINALIS VAG QL WET PREP: NOT DETECTED
T VAGINALIS VAG QL WET PREP: NOT DETECTED

## 2020-07-20 LAB — CYTOLOGY CVX/VAG DOC THIN PREP: ABNORMAL

## 2020-10-27 ENCOUNTER — RX CHANGE (OUTPATIENT)
Age: 20
End: 2020-10-27

## 2020-12-14 ENCOUNTER — RX CHANGE (OUTPATIENT)
Age: 20
End: 2020-12-14

## 2020-12-21 PROBLEM — Z86.19 HISTORY OF CANDIDAL VULVOVAGINITIS: Status: RESOLVED | Noted: 2019-10-17 | Resolved: 2020-12-21

## 2020-12-21 PROBLEM — N76.0 BACTERIAL VAGINOSIS: Status: RESOLVED | Noted: 2019-09-10 | Resolved: 2020-12-21

## 2020-12-21 PROBLEM — N39.0 URINARY TRACT INFECTION, ACUTE: Status: RESOLVED | Noted: 2019-12-20 | Resolved: 2020-12-21

## 2021-04-08 ENCOUNTER — APPOINTMENT (OUTPATIENT)
Dept: HEMATOLOGY ONCOLOGY | Facility: CLINIC | Age: 21
End: 2021-04-08
Payer: MEDICAID

## 2021-04-08 ENCOUNTER — RESULT REVIEW (OUTPATIENT)
Age: 21
End: 2021-04-08

## 2021-04-08 VITALS
HEART RATE: 81 BPM | TEMPERATURE: 98.1 F | WEIGHT: 132 LBS | BODY MASS INDEX: 24.92 KG/M2 | OXYGEN SATURATION: 97 % | DIASTOLIC BLOOD PRESSURE: 78 MMHG | HEIGHT: 61.2 IN | SYSTOLIC BLOOD PRESSURE: 117 MMHG | RESPIRATION RATE: 16 BRPM

## 2021-04-08 DIAGNOSIS — E61.1 IRON DEFICIENCY: ICD-10-CM

## 2021-04-08 PROCEDURE — 99213 OFFICE O/P EST LOW 20 MIN: CPT

## 2021-04-08 PROCEDURE — 99072 ADDL SUPL MATRL&STAF TM PHE: CPT

## 2021-04-08 RX ORDER — METRONIDAZOLE 500 MG/1
500 TABLET ORAL TWICE DAILY
Qty: 14 | Refills: 0 | Status: DISCONTINUED | COMMUNITY
Start: 2019-11-26 | End: 2021-04-08

## 2021-04-08 RX ORDER — METRONIDAZOLE 500 MG/1
500 TABLET ORAL TWICE DAILY
Qty: 14 | Refills: 0 | Status: DISCONTINUED | COMMUNITY
Start: 2019-09-12 | End: 2021-04-08

## 2021-04-08 RX ORDER — VITAMIN C, CALCIUM, IRON, VITAMIN D3, VITAMIN E, THIAMIN, RIBOFLAVIN, NIACINAMIDE, VITAMIN B6, FOLIC ACID, IODINE, ZINC, COPPER, DOCUSATE SODIUM 120; 85; 30; 3; 20; 20; 1; 25; 2; 50; 159; 4.54; 150; 5; 400; 3.4 MG/1; MG/1; [IU]/1; MG/1; MG/1; MG/1; MG/1; MG/1; MG/1; MG/1; MG/1; MG/1; UG/1; MG/1; [IU]/1; MG/1
90-1 & 300 TABLET ORAL
Qty: 60 | Refills: 11 | Status: DISCONTINUED | COMMUNITY
Start: 2019-09-17 | End: 2021-04-08

## 2021-04-08 RX ORDER — TERCONAZOLE 4 MG/G
0.4 CREAM VAGINAL
Qty: 7 | Refills: 0 | Status: DISCONTINUED | COMMUNITY
Start: 2019-11-22 | End: 2021-04-08

## 2021-04-08 RX ORDER — MICONAZOLE NITRATE 2 %
30-10-1-200 CREAM (GRAM) TOPICAL
Qty: 60 | Refills: 4 | Status: DISCONTINUED | COMMUNITY
Start: 2019-09-12 | End: 2021-04-08

## 2021-04-08 RX ORDER — TERCONAZOLE 8 MG/G
0.8 CREAM VAGINAL
Qty: 1 | Refills: 3 | Status: DISCONTINUED | COMMUNITY
Start: 2020-01-28 | End: 2021-04-08

## 2021-04-08 RX ORDER — DOXYLAMINE SUCCINATE AND PYRIDOXINE HYDROCHLORIDE 10; 10 MG/1; MG/1
10-10 TABLET, DELAYED RELEASE ORAL
Qty: 60 | Refills: 2 | Status: DISCONTINUED | COMMUNITY
Start: 2019-10-31 | End: 2021-04-08

## 2021-04-08 RX ORDER — TERCONAZOLE 4 MG/G
0.4 CREAM VAGINAL DAILY
Qty: 1 | Refills: 0 | Status: DISCONTINUED | COMMUNITY
Start: 2019-10-17 | End: 2021-04-08

## 2021-04-08 RX ORDER — METRONIDAZOLE 7.5 MG/G
0.75 GEL VAGINAL
Qty: 1 | Refills: 1 | Status: DISCONTINUED | COMMUNITY
Start: 2019-10-15 | End: 2021-04-08

## 2021-04-08 RX ORDER — CLOTRIMAZOLE 2 G/100G
2 CREAM VAGINAL
Qty: 1 | Refills: 0 | Status: DISCONTINUED | COMMUNITY
Start: 2020-04-17 | End: 2021-04-08

## 2021-04-08 RX ORDER — FLUCONAZOLE 150 MG/1
150 TABLET ORAL
Qty: 1 | Refills: 0 | Status: DISCONTINUED | COMMUNITY
Start: 2019-09-12 | End: 2021-04-08

## 2021-04-08 NOTE — ASSESSMENT
[FreeTextEntry1] : Iron deficiency\par Clinically feels better\par s/p injectafer  Feb 2020 \par Labs reviewed and discussed\par She cannot tolerate iron pills\par now with fatigue and shortness of breath on exertion - Hgb - 14.0, repeat Ferritin\par Advised to call tomorrow to go over results\par If low, will consider IV Iron. \par \par Easy bruising\par She has easy bruising interestingly noticed since March. Never had similar symptoms prior\par Work up largely unremarkable including platelet function testing which was negative except for  slight reduction of GP expression CD42b (GPIb-alpha) is of uncertain clinical significance, and likely represents laboratory artifact due to specimen processing and transportation\par vWF Ag 53%- repeat normal\par Now with light brownish discoloration skin lateral on left wrist - to closely monitor. \par \par rtc in 6 months \par cbc, cmp, feritin

## 2021-04-08 NOTE — HISTORY OF PRESENT ILLNESS
[de-identified] : Patient is a 18 year old female consult for ?von willebrand disease \par \par Started having easy bruising in March 2018- saw PCP\par Went away for few weeks\par In April - she drove for ~2 hours wearing jeans- had bruising bilateral thighs- she did not have similar symptoms using same jeans before\par \par She saw her PCP who did labs - which showed vWF Ab of 53%\par \par LMP - on birth control - skipped in march- had menses all April. \par Usually normal and does not have heavy bleeding\par Bleeding all of april- she attributes to stopping her birth control pills\par \par No nose bleeds or gum bleeds\par Does not play sports or do weight lifting\par Bruises when she hits against somethings\par Usually resolves in 1-2 weeks\par Also had bruising in bilateral breast- resolves in a few days\par \par Has migraines - takes advil PRN. Last taken many months back\par \par No family history of bleeding\par \par Has sister - who may be bruising easily but she is clumsy\par \par Labs reviewed\par vWd panel - negative\par Coag panel - negative\par Fibrinogen: normal\par \par Platelet function glycoprotein by flow cytometry:\par platelet surface glycoprotein (GP) profiles are\par essentially normal. There are no significant deficiencies\par of CD41 (GPIIb), CD42a (GPIX), CD42b (GPIb-alpha), CD49b\par (GPIa), CD61 (GPIIIa) or GPVI.\par The slight reduction of GP expression CD42b (GPIb-alpha) is\par of uncertain clinical significance, and likely represents\par laboratory artifact due to specimen processing and\par transportation.\par    [de-identified] : She is seen today for follow up visit, gave birth in May 2020. Reports of being more fatigue with shortness of breath with prolong exertion. She does have heavy menses 4 days out of 7 days every 28 days, currently on the 3rd day. \par \par \par \par \par

## 2021-07-21 ENCOUNTER — APPOINTMENT (OUTPATIENT)
Dept: OBGYN | Facility: CLINIC | Age: 21
End: 2021-07-21
Payer: MEDICAID

## 2021-07-21 VITALS
DIASTOLIC BLOOD PRESSURE: 70 MMHG | WEIGHT: 136 LBS | BODY MASS INDEX: 25.68 KG/M2 | HEIGHT: 61 IN | SYSTOLIC BLOOD PRESSURE: 110 MMHG

## 2021-07-21 DIAGNOSIS — Z01.419 ENCOUNTER FOR GYNECOLOGICAL EXAMINATION (GENERAL) (ROUTINE) W/OUT ABNORMAL FINDINGS: ICD-10-CM

## 2021-07-21 PROCEDURE — 99072 ADDL SUPL MATRL&STAF TM PHE: CPT

## 2021-07-21 PROCEDURE — 99395 PREV VISIT EST AGE 18-39: CPT

## 2021-07-22 NOTE — HISTORY OF PRESENT ILLNESS
[TextBox_4] : 22yo P1 here for routine annual.  She is taking Junel and happy with method.  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  Her daughter is 14months now.

## 2021-07-26 LAB — CYTOLOGY CVX/VAG DOC THIN PREP: NORMAL

## 2022-04-12 ENCOUNTER — APPOINTMENT (OUTPATIENT)
Dept: OBGYN | Facility: CLINIC | Age: 22
End: 2022-04-12

## 2022-06-09 ENCOUNTER — APPOINTMENT (OUTPATIENT)
Dept: OBGYN | Facility: CLINIC | Age: 22
End: 2022-06-09
Payer: COMMERCIAL

## 2022-06-09 ENCOUNTER — NON-APPOINTMENT (OUTPATIENT)
Age: 22
End: 2022-06-09

## 2022-06-09 VITALS
DIASTOLIC BLOOD PRESSURE: 72 MMHG | WEIGHT: 120 LBS | BODY MASS INDEX: 22.66 KG/M2 | HEIGHT: 61 IN | SYSTOLIC BLOOD PRESSURE: 112 MMHG

## 2022-06-09 DIAGNOSIS — Z87.59 PERSONAL HISTORY OF OTHER COMPLICATIONS OF PREGNANCY, CHILDBIRTH AND THE PUERPERIUM: ICD-10-CM

## 2022-06-09 DIAGNOSIS — O21.9 VOMITING OF PREGNANCY, UNSPECIFIED: ICD-10-CM

## 2022-06-09 DIAGNOSIS — B37.9 CANDIDIASIS, UNSPECIFIED: ICD-10-CM

## 2022-06-09 DIAGNOSIS — Z86.19 PERSONAL HISTORY OF OTHER INFECTIOUS AND PARASITIC DISEASES: ICD-10-CM

## 2022-06-09 DIAGNOSIS — Z32.00 ENCOUNTER FOR PREGNANCY TEST, RESULT UNKNOWN: ICD-10-CM

## 2022-06-09 DIAGNOSIS — Z3A.40 40 WEEKS GESTATION OF PREGNANCY: ICD-10-CM

## 2022-06-09 DIAGNOSIS — Z11.3 ENCOUNTER FOR SCREENING FOR INFECTIONS WITH A PREDOMINANTLY SEXUAL MODE OF TRANSMISSION: ICD-10-CM

## 2022-06-09 DIAGNOSIS — Z3A.39 39 WEEKS GESTATION OF PREGNANCY: ICD-10-CM

## 2022-06-09 DIAGNOSIS — Z3A.36 36 WEEKS GESTATION OF PREGNANCY: ICD-10-CM

## 2022-06-09 DIAGNOSIS — Z34.83 ENCOUNTER FOR SUPERVISION OF OTHER NORMAL PREGNANCY, THIRD TRIMESTER: ICD-10-CM

## 2022-06-09 PROCEDURE — 36415 COLL VENOUS BLD VENIPUNCTURE: CPT

## 2022-06-09 PROCEDURE — 99395 PREV VISIT EST AGE 18-39: CPT

## 2022-06-09 RX ORDER — TERCONAZOLE 4 MG/G
0.4 CREAM VAGINAL
Qty: 1 | Refills: 1 | Status: DISCONTINUED | COMMUNITY
Start: 2021-08-09 | End: 2022-06-09

## 2022-06-09 RX ORDER — NORETHINDRONE ACETATE AND ETHINYL ESTRADIOL AND FERROUS FUMARATE 1.5-30(21)
1.5-3 KIT ORAL DAILY
Qty: 3 | Refills: 2 | Status: DISCONTINUED | COMMUNITY
Start: 2018-05-07 | End: 2022-06-09

## 2022-06-09 RX ORDER — FLUCONAZOLE 150 MG/1
150 TABLET ORAL
Qty: 2 | Refills: 0 | Status: DISCONTINUED | COMMUNITY
Start: 2021-08-09 | End: 2022-06-09

## 2022-06-09 NOTE — COUNSELING
[Breast Self Exam] : breast self exam [STD (testing, results, tx)] : STD (testing, results, tx) [FreeTextEntry2] : Correct Valtrex use.

## 2022-06-09 NOTE — HISTORY OF PRESENT ILLNESS
[0] : 2) Feeling down, depressed, or hopeless: Not at all (0) [PHQ-2 Negative - No further assessment needed] : PHQ-2 Negative - No further assessment needed [[id number]] : 0 [FreeTextEntry1] : 21 y/o  presents for annual GYN exam.\par \par  from partner.\par \par C/o recurrent herpes outbreak. Has had for several years, but recently outbreaks are monthly. They have always been on buttock, but on Tuesday, she noticed blisters on the left side of her groin,\par \par Last pap 2021- NILM, History of  LGSIL, no HPV done.\par \par Desires STD testing.\par \par Had Gardasil.\par \par Regular periods on OCPs.\par \par Denies FH of ca of ovary, colon, breast or uterus.\par \par \par \par

## 2022-06-09 NOTE — PHYSICAL EXAM
[Chaperone Declined] : Patient declined chaperone [Appropriately responsive] : appropriately responsive [Alert] : alert [No Acute Distress] : no acute distress [No Lymphadenopathy] : no lymphadenopathy [Regular Rate Rhythm] : regular rate rhythm [No Murmurs] : no murmurs [Clear to Auscultation B/L] : clear to auscultation bilaterally [Soft] : soft [Non-tender] : non-tender [Non-distended] : non-distended [No HSM] : No HSM [No Lesions] : no lesions [No Mass] : no mass [Oriented x3] : oriented x3 [Examination Of The Breasts] : a normal appearance [No Discharge] : no discharge [No Masses] : no breast masses were palpable [Labia Majora] : normal [Labia Minora] : normal [Pink Rugae] : pink rugae [Normal] : normal [Uterine Adnexae] : normal [Tenderness] : nontender [Enlarged ___ wks] : not enlarged

## 2022-06-10 LAB
HBV SURFACE AG SER QL: NONREACTIVE
HCV AB SER QL: NONREACTIVE
HCV S/CO RATIO: 0.13 S/CO
HIV1+2 AB SPEC QL IA.RAPID: NONREACTIVE
T PALLIDUM AB SER QL IA: NEGATIVE

## 2022-06-13 LAB
C TRACH RRNA SPEC QL NAA+PROBE: NOT DETECTED
HSV 1+2 IGG SER IA-IMP: POSITIVE
HSV 1+2 IGG SER IA-IMP: POSITIVE
HSV+VZV DNA SPEC QL NAA+PROBE: ABNORMAL
HSV1 IGG SER QL: 4.64 INDEX
HSV2 IGG SER QL: 8.93 INDEX
N GONORRHOEA RRNA SPEC QL NAA+PROBE: NOT DETECTED
SOURCE AMPLIFICATION: NORMAL
SOURCE TP AMPLIFICATION: NORMAL
SPECIMEN SOURCE: NORMAL
T VAGINALIS RRNA SPEC QL NAA+PROBE: NOT DETECTED

## 2022-06-15 LAB — CYTOLOGY CVX/VAG DOC THIN PREP: NORMAL

## 2022-06-19 LAB
HSV1 IGM SER QL: NEGATIVE
HSV2 AB FLD-ACNC: NEGATIVE

## 2022-07-11 ENCOUNTER — RX RENEWAL (OUTPATIENT)
Age: 22
End: 2022-07-11

## 2023-04-14 ENCOUNTER — OFFICE (OUTPATIENT)
Dept: URBAN - METROPOLITAN AREA CLINIC 30 | Facility: CLINIC | Age: 23
Setting detail: OPHTHALMOLOGY
End: 2023-04-14
Payer: MEDICAID

## 2023-04-14 DIAGNOSIS — H16.222: ICD-10-CM

## 2023-04-14 DIAGNOSIS — H52.13: ICD-10-CM

## 2023-04-14 DIAGNOSIS — H35.463: ICD-10-CM

## 2023-04-14 DIAGNOSIS — H16.103: ICD-10-CM

## 2023-04-14 DIAGNOSIS — H18.892: ICD-10-CM

## 2023-04-14 PROBLEM — H16.223 DRY EYE SYNDROME; BOTH EYES: Status: ACTIVE | Noted: 2023-04-14

## 2023-04-14 PROCEDURE — 92014 COMPRE OPH EXAM EST PT 1/>: CPT | Performed by: OPHTHALMOLOGY

## 2023-04-14 PROCEDURE — 92310 CONTACT LENS FITTING OU: CPT | Performed by: OPHTHALMOLOGY

## 2023-04-14 PROCEDURE — 92202 OPSCPY EXTND ON/MAC DRAW: CPT | Performed by: OPHTHALMOLOGY

## 2023-04-14 ASSESSMENT — REFRACTION_MANIFEST
OS_VA1: 20/20
OD_CYLINDER: +1.00
OS_AXIS: 85
OD_CYLINDER: +1.00
OD_SPHERE: -5.00
OD_VA1: 20/20-2
OS_CYLINDER: +0.75
OS_VA1: 20/20
OS_SPHERE: -6.25
OS_CYLINDER: +0.50
OS_AXIS: 80
OD_VA1: 20/20-1
OD_AXIS: 85
OD_AXIS: 90
OS_SPHERE: -6.00
OD_SPHERE: -5.00

## 2023-04-14 ASSESSMENT — REFRACTION_CURRENTRX
OS_OVR_VA: 20/
OD_OVR_VA: 20/
OD_CYLINDER: +1.00
OD_CYLINDER: +1.00
OS_SPHERE: -5.75
OD_SPHERE: -5.00
OD_SPHERE: -4.75
OD_VPRISM_DIRECTION: SV
OS_CYLINDER: +0.75
OS_CYLINDER: +0.75
OD_AXIS: 085
OD_AXIS: 90
OS_SPHERE: -5.75
OS_AXIS: 78
OD_OVR_VA: 20/
OS_AXIS: 080
OS_VPRISM_DIRECTION: SV
OS_OVR_VA: 20/

## 2023-04-14 ASSESSMENT — SPHEQUIV_DERIVED
OS_SPHEQUIV: -5.625
OD_SPHEQUIV: -4.5
OD_SPHEQUIV: -4.375
OD_SPHEQUIV: -4.5
OS_SPHEQUIV: -5.75
OS_SPHEQUIV: -6

## 2023-04-14 ASSESSMENT — REFRACTION_AUTOREFRACTION
OS_AXIS: 85
OS_CYLINDER: +0.50
OD_CYLINDER: +0.75
OS_SPHERE: -6.00
OD_AXIS: 90
OD_SPHERE: -4.75

## 2023-04-14 ASSESSMENT — VISUAL ACUITY
OD_BCVA: 20/50
OS_BCVA: 20/30

## 2023-04-14 ASSESSMENT — CONFRONTATIONAL VISUAL FIELD TEST (CVF)
OS_FINDINGS: FULL
OD_FINDINGS: FULL

## 2023-05-09 ENCOUNTER — OFFICE (OUTPATIENT)
Dept: URBAN - METROPOLITAN AREA CLINIC 30 | Facility: CLINIC | Age: 23
Setting detail: OPHTHALMOLOGY
End: 2023-05-09
Payer: MEDICAID

## 2023-05-09 DIAGNOSIS — H35.463: ICD-10-CM

## 2023-05-09 DIAGNOSIS — H16.103: ICD-10-CM

## 2023-05-09 DIAGNOSIS — H16.223: ICD-10-CM

## 2023-05-09 PROCEDURE — 92012 INTRM OPH EXAM EST PATIENT: CPT | Performed by: OPHTHALMOLOGY

## 2023-05-09 PROCEDURE — 68761 CLOSE TEAR DUCT OPENING: CPT | Performed by: OPHTHALMOLOGY

## 2023-05-09 ASSESSMENT — REFRACTION_MANIFEST
OD_CYLINDER: +1.00
OD_SPHERE: -5.00
OS_SPHERE: -6.25
OS_AXIS: 80
OD_AXIS: 90
OD_SPHERE: -5.00
OD_AXIS: 85
OS_VA1: 20/20
OS_SPHERE: -6.00
OS_CYLINDER: +0.50
OD_CYLINDER: +1.00
OS_VA1: 20/20
OS_AXIS: 85
OS_CYLINDER: +0.75
OD_VA1: 20/20-1
OD_VA1: 20/20-2

## 2023-05-09 ASSESSMENT — REFRACTION_CURRENTRX
OD_CYLINDER: +1.00
OS_SPHERE: -5.75
OD_OVR_VA: 20/
OD_OVR_VA: 20/
OD_SPHERE: -5.00
OS_OVR_VA: 20/
OD_SPHERE: -4.75
OS_OVR_VA: 20/
OS_SPHERE: -5.75
OS_CYLINDER: +0.75
OD_CYLINDER: +1.00
OS_CYLINDER: +0.75
OD_AXIS: 90
OD_AXIS: 085
OD_VPRISM_DIRECTION: SV
OS_VPRISM_DIRECTION: SV
OS_AXIS: 78
OS_AXIS: 080

## 2023-05-09 ASSESSMENT — REFRACTION_AUTOREFRACTION
OS_AXIS: 85
OD_SPHERE: -4.75
OD_AXIS: 90
OD_CYLINDER: +0.75
OS_CYLINDER: +0.50
OS_SPHERE: -6.00

## 2023-05-09 ASSESSMENT — CONFRONTATIONAL VISUAL FIELD TEST (CVF)
OS_FINDINGS: FULL
OD_FINDINGS: FULL

## 2023-05-09 ASSESSMENT — SPHEQUIV_DERIVED
OD_SPHEQUIV: -4.375
OS_SPHEQUIV: -6
OD_SPHEQUIV: -4.5
OD_SPHEQUIV: -4.5
OS_SPHEQUIV: -5.625
OS_SPHEQUIV: -5.75

## 2023-05-09 ASSESSMENT — VISUAL ACUITY
OD_BCVA: 20/40-2
OS_BCVA: 20/25

## 2023-07-03 ENCOUNTER — RX RENEWAL (OUTPATIENT)
Age: 23
End: 2023-07-03

## 2023-08-15 ENCOUNTER — RX RENEWAL (OUTPATIENT)
Age: 23
End: 2023-08-15

## 2023-09-07 ENCOUNTER — APPOINTMENT (OUTPATIENT)
Dept: OBGYN | Facility: CLINIC | Age: 23
End: 2023-09-07
Payer: COMMERCIAL

## 2023-09-07 VITALS
DIASTOLIC BLOOD PRESSURE: 60 MMHG | SYSTOLIC BLOOD PRESSURE: 100 MMHG | WEIGHT: 124 LBS | BODY MASS INDEX: 23.41 KG/M2 | HEIGHT: 61 IN

## 2023-09-07 DIAGNOSIS — B00.9 HERPESVIRAL INFECTION, UNSPECIFIED: ICD-10-CM

## 2023-09-07 DIAGNOSIS — Z11.51 ENCOUNTER FOR SCREENING FOR HUMAN PAPILLOMAVIRUS (HPV): ICD-10-CM

## 2023-09-07 DIAGNOSIS — Z30.9 ENCOUNTER FOR CONTRACEPTIVE MANAGEMENT, UNSPECIFIED: ICD-10-CM

## 2023-09-07 DIAGNOSIS — N89.8 OTHER SPECIFIED NONINFLAMMATORY DISORDERS OF VAGINA: ICD-10-CM

## 2023-09-07 DIAGNOSIS — Z01.419 ENCOUNTER FOR GYNECOLOGICAL EXAMINATION (GENERAL) (ROUTINE) W/OUT ABNORMAL FINDINGS: ICD-10-CM

## 2023-09-07 DIAGNOSIS — Z11.3 ENCOUNTER FOR SCREENING FOR INFECTIONS WITH A PREDOMINANTLY SEXUAL MODE OF TRANSMISSION: ICD-10-CM

## 2023-09-07 DIAGNOSIS — L29.2 PRURITUS VULVAE: ICD-10-CM

## 2023-09-07 DIAGNOSIS — Z98.890 OTHER SPECIFIED POSTPROCEDURAL STATES: ICD-10-CM

## 2023-09-07 PROCEDURE — 99395 PREV VISIT EST AGE 18-39: CPT

## 2023-09-07 PROCEDURE — 36415 COLL VENOUS BLD VENIPUNCTURE: CPT

## 2023-09-07 RX ORDER — METRONIDAZOLE 500 MG/1
500 TABLET ORAL TWICE DAILY
Qty: 14 | Refills: 0 | Status: DISCONTINUED | COMMUNITY
Start: 2023-05-08 | End: 2023-09-07

## 2023-09-07 RX ORDER — CLOTRIMAZOLE AND BETAMETHASONE DIPROPIONATE 10; .5 MG/G; MG/G
1-0.05 CREAM TOPICAL TWICE DAILY
Qty: 1 | Refills: 1 | Status: ACTIVE | COMMUNITY
Start: 2023-09-07 | End: 1900-01-01

## 2023-09-07 RX ORDER — VALACYCLOVIR 500 MG/1
500 TABLET, FILM COATED ORAL DAILY
Qty: 90 | Refills: 3 | Status: ACTIVE | COMMUNITY
Start: 2020-03-12 | End: 1900-01-01

## 2023-09-07 NOTE — HISTORY OF PRESENT ILLNESS
[FreeTextEntry1] : 24 y/o  presents for annual GYN exam.   from partner. Had a brief relationship earlier in the year. Used condoms but agrees to STD testing today.  History of recurrent genital herpes outbreak. Takes Valacyclovir daily for suppression, sometimes goes an interval without taking it daily and then returns when she feels one coming on.  C/o vulvar itching and vaginal discharge.  Last pap 2022- NILM  Had Gardasil.  Regular periods on OCPs.  Denies FH of ca of ovary, colon, breast or uterus.  Lives with three-year-old daughter and parents. Works from home. Her parents will be going back to [place], but patient has to remain as baby's father lives in [place].

## 2023-09-07 NOTE — PHYSICAL EXAM
[Chaperone Declined] : Patient declined chaperone [Appropriately responsive] : appropriately responsive [Alert] : alert [No Acute Distress] : no acute distress [No Lymphadenopathy] : no lymphadenopathy [Regular Rate Rhythm] : regular rate rhythm [No Murmurs] : no murmurs [Clear to Auscultation B/L] : clear to auscultation bilaterally 88 [Soft] : soft [Non-tender] : non-tender [Non-distended] : non-distended [No HSM] : No HSM [No Mass] : no mass [Oriented x3] : oriented x3 [Examination Of The Breasts] : a normal appearance [No Discharge] : no discharge [No Masses] : no breast masses were palpable [Labia Majora] : normal [Labia Minora] : normal [Pink Rugae] : pink rugae [Normal] : normal [Uterine Adnexae] : normal [No Lesions] : no lesions  [Tenderness] : nontender [Enlarged ___ wks] : not enlarged

## 2023-09-08 LAB
C TRACH RRNA SPEC QL NAA+PROBE: NOT DETECTED
HIV1+2 AB SPEC QL IA.RAPID: NONREACTIVE
N GONORRHOEA RRNA SPEC QL NAA+PROBE: NOT DETECTED
SOURCE AMPLIFICATION: NORMAL
SOURCE AMPLIFICATION: NORMAL
T PALLIDUM AB SER QL IA: NEGATIVE
T VAGINALIS RRNA SPEC QL NAA+PROBE: NOT DETECTED

## 2023-09-11 ENCOUNTER — TRANSCRIPTION ENCOUNTER (OUTPATIENT)
Age: 23
End: 2023-09-11

## 2023-09-11 LAB
CANDIDA VAG CYTO: NOT DETECTED
G VAGINALIS+PREV SP MTYP VAG QL MICRO: NOT DETECTED
HBV SURFACE AG SER QL: NONREACTIVE
HCV AB SER QL: NONREACTIVE
HCV S/CO RATIO: 0.12 S/CO
HSV 1+2 IGG SER IA-IMP: POSITIVE
HSV 1+2 IGG SER IA-IMP: POSITIVE
HSV1 IGG SER QL: 5.05 INDEX
HSV2 IGG SER QL: 5.86 INDEX
T VAGINALIS VAG QL WET PREP: NOT DETECTED

## 2023-09-12 LAB
HERPES VIRUS 6 ANTIBODIES INTERPRETATION: NORMAL
HHV6 IGM SER QL: NORMAL
HHV6 IGM SER-ACNC: ABNORMAL

## 2023-11-16 ENCOUNTER — RX RENEWAL (OUTPATIENT)
Age: 23
End: 2023-11-16

## 2023-11-20 RX ORDER — NORETHINDRONE ACETATE AND ETHINYL ESTRADIOL 1.5; 3 MG/1; UG/1
1.5-3 TABLET ORAL
Qty: 63 | Refills: 3 | Status: ACTIVE | COMMUNITY
Start: 2021-07-21

## 2023-12-31 PROBLEM — Z11.3 ENCOUNTER FOR SCREENING EXAMINATION FOR SEXUALLY TRANSMITTED DISEASE: Status: RESOLVED | Noted: 2022-06-09 | Resolved: 2022-06-23

## 2024-04-09 ENCOUNTER — OFFICE (OUTPATIENT)
Dept: URBAN - METROPOLITAN AREA CLINIC 30 | Facility: CLINIC | Age: 24
Setting detail: OPHTHALMOLOGY
End: 2024-04-09
Payer: MEDICAID

## 2024-04-09 DIAGNOSIS — H16.103: ICD-10-CM

## 2024-04-09 DIAGNOSIS — H16.223: ICD-10-CM

## 2024-04-09 DIAGNOSIS — H52.13: ICD-10-CM

## 2024-04-09 DIAGNOSIS — H35.463: ICD-10-CM

## 2024-04-09 PROCEDURE — 92014 COMPRE OPH EXAM EST PT 1/>: CPT | Mod: 25 | Performed by: OPHTHALMOLOGY

## 2024-04-09 PROCEDURE — 92201 OPSCPY EXTND RTA DRAW UNI/BI: CPT | Performed by: OPHTHALMOLOGY

## 2024-04-09 PROCEDURE — 92015 DETERMINE REFRACTIVE STATE: CPT | Performed by: OPHTHALMOLOGY

## 2024-04-09 PROCEDURE — 68761 CLOSE TEAR DUCT OPENING: CPT | Mod: 50 | Performed by: OPHTHALMOLOGY

## 2024-06-11 ENCOUNTER — OFFICE (OUTPATIENT)
Dept: URBAN - METROPOLITAN AREA CLINIC 30 | Facility: CLINIC | Age: 24
Setting detail: OPHTHALMOLOGY
End: 2024-06-11
Payer: MEDICAID
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
## 2024-06-11 PROCEDURE — 10004 FNA BX W/O IMG GDN EA ADDL: CPT | Performed by: OPHTHALMOLOGY

## 2024-09-27 ENCOUNTER — LABORATORY RESULT (OUTPATIENT)
Age: 24
End: 2024-09-27

## 2024-09-27 ENCOUNTER — APPOINTMENT (OUTPATIENT)
Dept: OBGYN | Facility: CLINIC | Age: 24
End: 2024-09-27
Payer: COMMERCIAL

## 2024-09-27 VITALS
WEIGHT: 136 LBS | DIASTOLIC BLOOD PRESSURE: 60 MMHG | HEIGHT: 61 IN | BODY MASS INDEX: 25.68 KG/M2 | SYSTOLIC BLOOD PRESSURE: 100 MMHG

## 2024-09-27 DIAGNOSIS — Z01.419 ENCOUNTER FOR GYNECOLOGICAL EXAMINATION (GENERAL) (ROUTINE) W/OUT ABNORMAL FINDINGS: ICD-10-CM

## 2024-09-27 DIAGNOSIS — Z11.3 ENCOUNTER FOR SCREENING FOR INFECTIONS WITH A PREDOMINANTLY SEXUAL MODE OF TRANSMISSION: ICD-10-CM

## 2024-09-27 PROCEDURE — 99395 PREV VISIT EST AGE 18-39: CPT

## 2024-09-27 NOTE — HISTORY OF PRESENT ILLNESS
[FreeTextEntry1] : 25 y/o  presents for annual GYN exam.   from partner. Not currently sexually active.  Desires STD testing.  Light monthly periods on OCPs.   History of recurrent genital herpes outbreak. Takes Valacyclovir daily for suppression, sometimes goes an interval without taking it daily and then returns when she feels one coming on.  No GYN complaints  Last pap 2022- NILM  History of HPV vaccine.  Denies FH of ca of ovary, colon, breast or uterus.  Lives with four-year-old daughter and parents. Works from home. Her parents will be going back to [employer], but patient has to remain as baby's father lives in [place].

## 2024-09-30 LAB
C TRACH RRNA SPEC QL NAA+PROBE: NOT DETECTED
HBV SURFACE AG SER QL: NONREACTIVE
HCV AB SER QL: NONREACTIVE
HCV S/CO RATIO: 0.15 S/CO
HIV1+2 AB SPEC QL IA.RAPID: NONREACTIVE
HPV 16 E6+E7 MRNA CVX QL NAA+PROBE: NOT DETECTED
HPV HIGH+LOW RISK DNA PNL CVX: NOT DETECTED
HPV18+45 E6+E7 MRNA CVX QL NAA+PROBE: NOT DETECTED
N GONORRHOEA RRNA SPEC QL NAA+PROBE: NOT DETECTED
SOURCE TP AMPLIFICATION: NORMAL
T PALLIDUM AB SER QL IA: NEGATIVE

## 2024-10-03 ENCOUNTER — OFFICE (OUTPATIENT)
Facility: LOCATION | Age: 24
Setting detail: OPHTHALMOLOGY
End: 2024-10-03
Payer: COMMERCIAL

## 2024-10-03 DIAGNOSIS — H35.463: ICD-10-CM

## 2024-10-03 DIAGNOSIS — H16.041: ICD-10-CM

## 2024-10-03 DIAGNOSIS — H16.103: ICD-10-CM

## 2024-10-03 DIAGNOSIS — H16.223: ICD-10-CM

## 2024-10-03 PROCEDURE — 99213 OFFICE O/P EST LOW 20 MIN: CPT | Performed by: OPHTHALMOLOGY

## 2024-10-03 ASSESSMENT — CONFRONTATIONAL VISUAL FIELD TEST (CVF)
OS_FINDINGS: FULL
OD_FINDINGS: FULL

## 2024-10-03 ASSESSMENT — REFRACTION_CURRENTRX
OD_SPHERE: -4.75
OS_OVR_VA: 20/
OD_VPRISM_DIRECTION: SV
OS_CYLINDER: +0.50
OD_CYLINDER: +1.00
OS_VPRISM_DIRECTION: SV
OS_OVR_VA: 20/
OS_SPHERE: -6.25
OD_OVR_VA: 20/
OS_AXIS: 081
OD_AXIS: 089
OD_OVR_VA: 20/

## 2024-10-03 ASSESSMENT — REFRACTION_AUTOREFRACTION
OD_CYLINDER: +1.25
OS_AXIS: 090
OS_CYLINDER: +1.00
OD_AXIS: 095
OD_SPHERE: -5.50
OS_SPHERE: -7.00

## 2024-10-03 ASSESSMENT — REFRACTION_MANIFEST
OD_CYLINDER: +1.00
OS_AXIS: 85
OS_VA1: 20/20
OS_SPHERE: -6.25
OS_CYLINDER: +0.50
OD_SPHERE: -5.00
OD_VA1: 20/20-1
OD_AXIS: 90

## 2024-10-03 ASSESSMENT — TONOMETRY
OS_IOP_MMHG: 10
OD_IOP_MMHG: 10

## 2024-10-03 ASSESSMENT — VISUAL ACUITY
OD_BCVA: 20/20
OS_BCVA: 20/20

## 2024-10-04 ENCOUNTER — RX ONLY (RX ONLY)
Age: 24
End: 2024-10-04

## 2024-10-04 ENCOUNTER — OFFICE (OUTPATIENT)
Facility: LOCATION | Age: 24
Setting detail: OPHTHALMOLOGY
End: 2024-10-04
Payer: COMMERCIAL

## 2024-10-04 DIAGNOSIS — H35.463: ICD-10-CM

## 2024-10-04 DIAGNOSIS — H16.041: ICD-10-CM

## 2024-10-04 DIAGNOSIS — H16.103: ICD-10-CM

## 2024-10-04 DIAGNOSIS — H16.223: ICD-10-CM

## 2024-10-04 PROCEDURE — 92014 COMPRE OPH EXAM EST PT 1/>: CPT | Performed by: OPHTHALMOLOGY

## 2024-10-04 ASSESSMENT — REFRACTION_MANIFEST
OD_VA1: 20/20-1
OD_CYLINDER: +1.00
OS_VA1: 20/20
OS_CYLINDER: +0.50
OS_SPHERE: -6.25
OD_SPHERE: -5.00
OS_AXIS: 85
OD_AXIS: 90

## 2024-10-04 ASSESSMENT — VISUAL ACUITY
OS_BCVA: 20/20
OD_BCVA: 20/20

## 2024-10-04 ASSESSMENT — REFRACTION_AUTOREFRACTION
OD_SPHERE: -5.50
OD_CYLINDER: +1.25
OS_CYLINDER: +1.00
OD_AXIS: 095
OS_AXIS: 090
OS_SPHERE: -7.00

## 2024-10-04 ASSESSMENT — REFRACTION_CURRENTRX
OS_SPHERE: -6.25
OD_AXIS: 089
OS_CYLINDER: +0.50
OD_SPHERE: -4.75
OD_VPRISM_DIRECTION: SV
OS_OVR_VA: 20/
OS_VPRISM_DIRECTION: SV
OS_AXIS: 081
OD_OVR_VA: 20/
OD_OVR_VA: 20/
OS_OVR_VA: 20/
OD_CYLINDER: +1.00

## 2024-10-04 ASSESSMENT — CONFRONTATIONAL VISUAL FIELD TEST (CVF)
OD_FINDINGS: FULL
OS_FINDINGS: FULL

## 2024-10-04 ASSESSMENT — TONOMETRY: OD_IOP_MMHG: 15

## 2024-10-09 ENCOUNTER — TRANSCRIPTION ENCOUNTER (OUTPATIENT)
Age: 24
End: 2024-10-09

## 2024-10-09 ENCOUNTER — OFFICE (OUTPATIENT)
Facility: LOCATION | Age: 24
Setting detail: OPHTHALMOLOGY
End: 2024-10-09
Payer: COMMERCIAL

## 2024-10-09 ENCOUNTER — RX RENEWAL (OUTPATIENT)
Age: 24
End: 2024-10-09

## 2024-10-09 DIAGNOSIS — H35.463: ICD-10-CM

## 2024-10-09 DIAGNOSIS — H16.103: ICD-10-CM

## 2024-10-09 DIAGNOSIS — H16.041: ICD-10-CM

## 2024-10-09 DIAGNOSIS — H16.223: ICD-10-CM

## 2024-10-09 PROCEDURE — 92014 COMPRE OPH EXAM EST PT 1/>: CPT | Performed by: OPHTHALMOLOGY

## 2024-10-09 ASSESSMENT — VISUAL ACUITY
OD_BCVA: 20/20
OS_BCVA: 20/20

## 2024-10-09 ASSESSMENT — REFRACTION_CURRENTRX
OS_CYLINDER: +0.50
OD_VPRISM_DIRECTION: SV
OD_SPHERE: -4.75
OD_AXIS: 089
OD_OVR_VA: 20/
OD_CYLINDER: +1.00
OS_VPRISM_DIRECTION: SV
OD_OVR_VA: 20/
OS_OVR_VA: 20/
OS_AXIS: 081
OS_SPHERE: -6.25
OS_OVR_VA: 20/

## 2024-10-09 ASSESSMENT — CONFRONTATIONAL VISUAL FIELD TEST (CVF)
OS_FINDINGS: FULL
OD_FINDINGS: FULL

## 2024-10-09 ASSESSMENT — REFRACTION_MANIFEST
OD_SPHERE: -5.00
OD_VA1: 20/20-1
OS_CYLINDER: +0.50
OS_AXIS: 85
OD_CYLINDER: +1.00
OD_AXIS: 90
OS_SPHERE: -6.25
OS_VA1: 20/20

## 2024-10-09 ASSESSMENT — TONOMETRY: OD_IOP_MMHG: 15

## 2024-10-09 ASSESSMENT — REFRACTION_AUTOREFRACTION
OS_AXIS: 090
OD_SPHERE: -5.50
OS_CYLINDER: +1.00
OD_AXIS: 095
OD_CYLINDER: +1.25
OS_SPHERE: -7.00

## 2025-03-04 ENCOUNTER — APPOINTMENT (OUTPATIENT)
Dept: GASTROENTEROLOGY | Facility: CLINIC | Age: 25
End: 2025-03-04

## 2025-05-02 ENCOUNTER — NON-APPOINTMENT (OUTPATIENT)
Age: 25
End: 2025-05-02

## 2025-05-06 ENCOUNTER — NON-APPOINTMENT (OUTPATIENT)
Age: 25
End: 2025-05-06

## 2025-05-07 ENCOUNTER — APPOINTMENT (OUTPATIENT)
Dept: GASTROENTEROLOGY | Facility: CLINIC | Age: 25
End: 2025-05-07
Payer: COMMERCIAL

## 2025-05-07 VITALS
DIASTOLIC BLOOD PRESSURE: 62 MMHG | HEIGHT: 61 IN | WEIGHT: 136 LBS | BODY MASS INDEX: 25.68 KG/M2 | HEART RATE: 80 BPM | SYSTOLIC BLOOD PRESSURE: 100 MMHG | OXYGEN SATURATION: 97 %

## 2025-05-07 DIAGNOSIS — R14.0 ABDOMINAL DISTENSION (GASEOUS): ICD-10-CM

## 2025-05-07 DIAGNOSIS — E61.1 IRON DEFICIENCY: ICD-10-CM

## 2025-05-07 DIAGNOSIS — R19.7 DIARRHEA, UNSPECIFIED: ICD-10-CM

## 2025-05-07 DIAGNOSIS — R10.9 UNSPECIFIED ABDOMINAL PAIN: ICD-10-CM

## 2025-05-07 LAB
ALBUMIN SERPL ELPH-MCNC: 4.8 G/DL
ALP BLD-CCNC: 76 U/L
ALT SERPL-CCNC: 16 U/L
ANION GAP SERPL CALC-SCNC: 14 MMOL/L
AST SERPL-CCNC: 17 U/L
BILIRUB SERPL-MCNC: 0.4 MG/DL
BUN SERPL-MCNC: 13 MG/DL
CALCIUM SERPL-MCNC: 10.3 MG/DL
CHLORIDE SERPL-SCNC: 102 MMOL/L
CO2 SERPL-SCNC: 21 MMOL/L
CREAT SERPL-MCNC: 0.81 MG/DL
CRP SERPL-MCNC: 4 MG/L
EGFRCR SERPLBLD CKD-EPI 2021: 102 ML/MIN/1.73M2
FERRITIN SERPL-MCNC: 114 NG/ML
GLUCOSE SERPL-MCNC: 90 MG/DL
HCT VFR BLD CALC: 41.3 %
HGB BLD-MCNC: 13.2 G/DL
IRON SATN MFR SERPL: 15 %
IRON SERPL-MCNC: 67 UG/DL
MCHC RBC-ENTMCNC: 29.7 PG
MCHC RBC-ENTMCNC: 32 G/DL
MCV RBC AUTO: 92.8 FL
PLATELET # BLD AUTO: 319 K/UL
POTASSIUM SERPL-SCNC: 4.7 MMOL/L
PROT SERPL-MCNC: 8.2 G/DL
RBC # BLD: 4.45 M/UL
RBC # FLD: 13.2 %
SODIUM SERPL-SCNC: 138 MMOL/L
TIBC SERPL-MCNC: 452 UG/DL
TRANSFERRIN SERPL-MCNC: 372 MG/DL
UIBC SERPL-MCNC: 385 UG/DL
WBC # FLD AUTO: 7.74 K/UL

## 2025-05-07 PROCEDURE — 99203 OFFICE O/P NEW LOW 30 MIN: CPT

## 2025-05-07 PROCEDURE — 36415 COLL VENOUS BLD VENIPUNCTURE: CPT

## 2025-05-08 LAB
ENDOMYSIUM IGA SER QL: NEGATIVE
ENDOMYSIUM IGA TITR SER: NORMAL
GLIADIN IGA SER QL: 1.2 U/ML
GLIADIN IGG SER QL: <0.4 U/ML
GLIADIN PEPTIDE IGA SER-ACNC: NEGATIVE
GLIADIN PEPTIDE IGG SER-ACNC: NEGATIVE
IGA SERPL-MCNC: 239 MG/DL
TTG IGA SER IA-ACNC: <0.5 U/ML
TTG IGA SER-ACNC: NEGATIVE
TTG IGG SER IA-ACNC: <0.8 U/ML
TTG IGG SER IA-ACNC: NEGATIVE

## 2025-05-09 LAB — CDIFF BY PCR: NOT DETECTED

## 2025-05-11 LAB
BACTERIA STL CULT: NORMAL
G LAMBLIA+C PARVUM AG STL QL: NORMAL

## 2025-05-12 LAB — DEPRECATED O AND P PREP STL: NORMAL

## 2025-05-13 LAB
CALPROTECTIN FECAL: 7 UG/G
H PYLORI AG STL QL: NEGATIVE

## 2025-05-19 DIAGNOSIS — E61.1 IRON DEFICIENCY: ICD-10-CM

## 2025-06-06 RX ORDER — SODIUM SULFATE, POTASSIUM SULFATE AND MAGNESIUM SULFATE 1.6; 3.13; 17.5 G/177ML; G/177ML; G/177ML
17.5-3.13-1.6 SOLUTION ORAL
Qty: 2 | Refills: 0 | Status: ACTIVE | COMMUNITY
Start: 2025-06-06 | End: 1900-01-01

## 2025-06-30 ENCOUNTER — OFFICE (OUTPATIENT)
Dept: URBAN - METROPOLITAN AREA CLINIC 29 | Facility: CLINIC | Age: 25
Setting detail: OPHTHALMOLOGY
End: 2025-06-30
Payer: COMMERCIAL

## 2025-06-30 DIAGNOSIS — H16.103: ICD-10-CM

## 2025-06-30 DIAGNOSIS — S05.02XA: ICD-10-CM

## 2025-06-30 PROCEDURE — 99213 OFFICE O/P EST LOW 20 MIN: CPT | Performed by: OPHTHALMOLOGY

## 2025-06-30 ASSESSMENT — VISUAL ACUITY
OS_BCVA: 20/30
OD_BCVA: 20/20-2

## 2025-06-30 ASSESSMENT — CONFRONTATIONAL VISUAL FIELD TEST (CVF)
OS_FINDINGS: FULL
OD_FINDINGS: FULL

## 2025-07-23 ENCOUNTER — APPOINTMENT (OUTPATIENT)
Dept: GASTROENTEROLOGY | Facility: HOSPITAL | Age: 25
End: 2025-07-23

## 2025-07-23 ENCOUNTER — RESULT REVIEW (OUTPATIENT)
Age: 25
End: 2025-07-23